# Patient Record
Sex: FEMALE | Race: WHITE | NOT HISPANIC OR LATINO | Employment: OTHER | URBAN - METROPOLITAN AREA
[De-identification: names, ages, dates, MRNs, and addresses within clinical notes are randomized per-mention and may not be internally consistent; named-entity substitution may affect disease eponyms.]

---

## 2024-04-21 ENCOUNTER — HOSPITAL ENCOUNTER (INPATIENT)
Facility: HOSPITAL | Age: 79
LOS: 4 days | Discharge: NURSING, REHAB OR LONG-TERM CARE | End: 2024-04-25
Attending: HOSPITALIST | Admitting: HOSPITALIST
Payer: MEDICARE

## 2024-04-21 ENCOUNTER — APPOINTMENT (OUTPATIENT)
Dept: GENERAL RADIOLOGY | Facility: HOSPITAL | Age: 79
End: 2024-04-21
Payer: MEDICARE

## 2024-04-21 DIAGNOSIS — S72.002A CLOSED FRACTURE OF NECK OF LEFT FEMUR, INITIAL ENCOUNTER: Primary | ICD-10-CM

## 2024-04-21 PROBLEM — I48.91 ATRIAL FIBRILLATION: Status: ACTIVE | Noted: 2024-04-21

## 2024-04-21 PROBLEM — I10 ESSENTIAL HYPERTENSION: Status: ACTIVE | Noted: 2024-04-21

## 2024-04-21 PROBLEM — F03.90 DEMENTIA: Status: ACTIVE | Noted: 2024-04-21

## 2024-04-21 PROBLEM — F41.9 ANXIETY DISORDER: Status: ACTIVE | Noted: 2024-04-21

## 2024-04-21 PROBLEM — D72.829 LEUKOCYTOSIS: Status: ACTIVE | Noted: 2024-04-21

## 2024-04-21 LAB
ABO GROUP BLD: NORMAL
ANION GAP SERPL CALCULATED.3IONS-SCNC: 10 MMOL/L (ref 5–15)
ANION GAP SERPL CALCULATED.3IONS-SCNC: 6 MMOL/L (ref 5–15)
APTT PPP: 29.1 SECONDS (ref 61–76.5)
BASOPHILS # BLD AUTO: 0.01 10*3/MM3 (ref 0–0.2)
BASOPHILS # BLD AUTO: 0.01 10*3/MM3 (ref 0–0.2)
BASOPHILS NFR BLD AUTO: 0.1 % (ref 0–1.5)
BASOPHILS NFR BLD AUTO: 0.1 % (ref 0–1.5)
BLD GP AB SCN SERPL QL: NEGATIVE
BUN SERPL-MCNC: 27 MG/DL (ref 8–23)
BUN SERPL-MCNC: 29 MG/DL (ref 8–23)
BUN/CREAT SERPL: 32.5 (ref 7–25)
BUN/CREAT SERPL: 35.4 (ref 7–25)
CALCIUM SPEC-SCNC: 8.7 MG/DL (ref 8.6–10.5)
CALCIUM SPEC-SCNC: 8.8 MG/DL (ref 8.6–10.5)
CHLORIDE SERPL-SCNC: 104 MMOL/L (ref 98–107)
CHLORIDE SERPL-SCNC: 105 MMOL/L (ref 98–107)
CO2 SERPL-SCNC: 28 MMOL/L (ref 22–29)
CO2 SERPL-SCNC: 31 MMOL/L (ref 22–29)
CREAT SERPL-MCNC: 0.82 MG/DL (ref 0.57–1)
CREAT SERPL-MCNC: 0.83 MG/DL (ref 0.57–1)
DEPRECATED RDW RBC AUTO: 48 FL (ref 37–54)
DEPRECATED RDW RBC AUTO: 48.5 FL (ref 37–54)
EGFRCR SERPLBLD CKD-EPI 2021: 72.3 ML/MIN/1.73
EGFRCR SERPLBLD CKD-EPI 2021: 73.3 ML/MIN/1.73
EOSINOPHIL # BLD AUTO: 0.01 10*3/MM3 (ref 0–0.4)
EOSINOPHIL # BLD AUTO: 0.03 10*3/MM3 (ref 0–0.4)
EOSINOPHIL NFR BLD AUTO: 0.1 % (ref 0.3–6.2)
EOSINOPHIL NFR BLD AUTO: 0.3 % (ref 0.3–6.2)
ERYTHROCYTE [DISTWIDTH] IN BLOOD BY AUTOMATED COUNT: 13.4 % (ref 12.3–15.4)
ERYTHROCYTE [DISTWIDTH] IN BLOOD BY AUTOMATED COUNT: 13.4 % (ref 12.3–15.4)
GLUCOSE SERPL-MCNC: 105 MG/DL (ref 65–99)
GLUCOSE SERPL-MCNC: 123 MG/DL (ref 65–99)
HCT VFR BLD AUTO: 43.6 % (ref 34–46.6)
HCT VFR BLD AUTO: 45.2 % (ref 34–46.6)
HGB BLD-MCNC: 14.2 G/DL (ref 12–15.9)
HGB BLD-MCNC: 14.3 G/DL (ref 12–15.9)
HOLD SPECIMEN: NORMAL
HOLD SPECIMEN: NORMAL
IMM GRANULOCYTES # BLD AUTO: 0.04 10*3/MM3 (ref 0–0.05)
IMM GRANULOCYTES # BLD AUTO: 0.05 10*3/MM3 (ref 0–0.05)
IMM GRANULOCYTES NFR BLD AUTO: 0.4 % (ref 0–0.5)
IMM GRANULOCYTES NFR BLD AUTO: 0.5 % (ref 0–0.5)
INR PPP: 0.96 (ref 0.93–1.1)
LYMPHOCYTES # BLD AUTO: 0.98 10*3/MM3 (ref 0.7–3.1)
LYMPHOCYTES # BLD AUTO: 1.67 10*3/MM3 (ref 0.7–3.1)
LYMPHOCYTES NFR BLD AUTO: 16.7 % (ref 19.6–45.3)
LYMPHOCYTES NFR BLD AUTO: 8.9 % (ref 19.6–45.3)
MCH RBC QN AUTO: 31 PG (ref 26.6–33)
MCH RBC QN AUTO: 31.7 PG (ref 26.6–33)
MCHC RBC AUTO-ENTMCNC: 31.6 G/DL (ref 31.5–35.7)
MCHC RBC AUTO-ENTMCNC: 32.6 G/DL (ref 31.5–35.7)
MCV RBC AUTO: 97.3 FL (ref 79–97)
MCV RBC AUTO: 98 FL (ref 79–97)
MONOCYTES # BLD AUTO: 0.91 10*3/MM3 (ref 0.1–0.9)
MONOCYTES # BLD AUTO: 1.08 10*3/MM3 (ref 0.1–0.9)
MONOCYTES NFR BLD AUTO: 10.8 % (ref 5–12)
MONOCYTES NFR BLD AUTO: 8.3 % (ref 5–12)
NEUTROPHILS NFR BLD AUTO: 7.17 10*3/MM3 (ref 1.7–7)
NEUTROPHILS NFR BLD AUTO: 71.7 % (ref 42.7–76)
NEUTROPHILS NFR BLD AUTO: 82.1 % (ref 42.7–76)
NEUTROPHILS NFR BLD AUTO: 9 10*3/MM3 (ref 1.7–7)
NRBC BLD AUTO-RTO: 0 /100 WBC (ref 0–0.2)
NRBC BLD AUTO-RTO: 0 /100 WBC (ref 0–0.2)
PLATELET # BLD AUTO: 272 10*3/MM3 (ref 140–450)
PLATELET # BLD AUTO: 285 10*3/MM3 (ref 140–450)
PMV BLD AUTO: 9.8 FL (ref 6–12)
PMV BLD AUTO: 9.8 FL (ref 6–12)
POTASSIUM SERPL-SCNC: 4.1 MMOL/L (ref 3.5–5.2)
POTASSIUM SERPL-SCNC: 4.5 MMOL/L (ref 3.5–5.2)
PROTHROMBIN TIME: 10.5 SECONDS (ref 9.6–11.7)
RBC # BLD AUTO: 4.48 10*6/MM3 (ref 3.77–5.28)
RBC # BLD AUTO: 4.61 10*6/MM3 (ref 3.77–5.28)
RH BLD: POSITIVE
SODIUM SERPL-SCNC: 142 MMOL/L (ref 136–145)
SODIUM SERPL-SCNC: 142 MMOL/L (ref 136–145)
T&S EXPIRATION DATE: NORMAL
WBC NRBC COR # BLD AUTO: 10 10*3/MM3 (ref 3.4–10.8)
WBC NRBC COR # BLD AUTO: 10.96 10*3/MM3 (ref 3.4–10.8)

## 2024-04-21 PROCEDURE — 86900 BLOOD TYPING SEROLOGIC ABO: CPT

## 2024-04-21 PROCEDURE — 86901 BLOOD TYPING SEROLOGIC RH(D): CPT | Performed by: INTERNAL MEDICINE

## 2024-04-21 PROCEDURE — 80048 BASIC METABOLIC PNL TOTAL CA: CPT | Performed by: NURSE PRACTITIONER

## 2024-04-21 PROCEDURE — 85025 COMPLETE CBC W/AUTO DIFF WBC: CPT | Performed by: PHYSICIAN ASSISTANT

## 2024-04-21 PROCEDURE — 93005 ELECTROCARDIOGRAM TRACING: CPT | Performed by: PHYSICIAN ASSISTANT

## 2024-04-21 PROCEDURE — 87641 MR-STAPH DNA AMP PROBE: CPT | Performed by: INTERNAL MEDICINE

## 2024-04-21 PROCEDURE — 99285 EMERGENCY DEPT VISIT HI MDM: CPT

## 2024-04-21 PROCEDURE — 80048 BASIC METABOLIC PNL TOTAL CA: CPT | Performed by: PHYSICIAN ASSISTANT

## 2024-04-21 PROCEDURE — 25810000003 SODIUM CHLORIDE 0.9 % SOLUTION: Performed by: NURSE PRACTITIONER

## 2024-04-21 PROCEDURE — 71045 X-RAY EXAM CHEST 1 VIEW: CPT

## 2024-04-21 PROCEDURE — 86850 RBC ANTIBODY SCREEN: CPT | Performed by: INTERNAL MEDICINE

## 2024-04-21 PROCEDURE — 86900 BLOOD TYPING SEROLOGIC ABO: CPT | Performed by: INTERNAL MEDICINE

## 2024-04-21 PROCEDURE — 87102 FUNGUS ISOLATION CULTURE: CPT | Performed by: INTERNAL MEDICINE

## 2024-04-21 PROCEDURE — 73502 X-RAY EXAM HIP UNI 2-3 VIEWS: CPT

## 2024-04-21 PROCEDURE — 85730 THROMBOPLASTIN TIME PARTIAL: CPT | Performed by: PHYSICIAN ASSISTANT

## 2024-04-21 PROCEDURE — 96361 HYDRATE IV INFUSION ADD-ON: CPT

## 2024-04-21 PROCEDURE — 85610 PROTHROMBIN TIME: CPT | Performed by: PHYSICIAN ASSISTANT

## 2024-04-21 PROCEDURE — 86901 BLOOD TYPING SEROLOGIC RH(D): CPT

## 2024-04-21 PROCEDURE — 85025 COMPLETE CBC W/AUTO DIFF WBC: CPT | Performed by: NURSE PRACTITIONER

## 2024-04-21 RX ORDER — TRAMADOL HYDROCHLORIDE 50 MG/1
50 TABLET ORAL EVERY 4 HOURS PRN
COMMUNITY

## 2024-04-21 RX ORDER — POLYETHYLENE GLYCOL 3350 17 G/17G
17 POWDER, FOR SOLUTION ORAL DAILY PRN
COMMUNITY
End: 2024-04-25 | Stop reason: HOSPADM

## 2024-04-21 RX ORDER — PREDNISONE 10 MG/1
10 TABLET ORAL DAILY
Status: DISCONTINUED | OUTPATIENT
Start: 2024-04-22 | End: 2024-04-25 | Stop reason: HOSPADM

## 2024-04-21 RX ORDER — POLYETHYLENE GLYCOL 3350 17 G/17G
17 POWDER, FOR SOLUTION ORAL EVERY MORNING
COMMUNITY
End: 2024-04-25 | Stop reason: HOSPADM

## 2024-04-21 RX ORDER — DILTIAZEM HYDROCHLORIDE 240 MG/1
240 CAPSULE, EXTENDED RELEASE ORAL EVERY MORNING
COMMUNITY
Start: 2024-04-07 | End: 2024-04-25 | Stop reason: HOSPADM

## 2024-04-21 RX ORDER — PREDNISONE 10 MG/1
10 TABLET ORAL DAILY
COMMUNITY
Start: 2024-04-11

## 2024-04-21 RX ORDER — SODIUM CHLORIDE 9 MG/ML
75 INJECTION, SOLUTION INTRAVENOUS CONTINUOUS
Status: DISCONTINUED | OUTPATIENT
Start: 2024-04-21 | End: 2024-04-25 | Stop reason: HOSPADM

## 2024-04-21 RX ORDER — POLYETHYLENE GLYCOL 3350 17 G/17G
17 POWDER, FOR SOLUTION ORAL DAILY
Status: DISCONTINUED | OUTPATIENT
Start: 2024-04-22 | End: 2024-04-25 | Stop reason: HOSPADM

## 2024-04-21 RX ORDER — ACETAMINOPHEN 325 MG/1
325 TABLET ORAL 4 TIMES DAILY
Status: DISCONTINUED | OUTPATIENT
Start: 2024-04-21 | End: 2024-04-25 | Stop reason: HOSPADM

## 2024-04-21 RX ORDER — RISPERIDONE 0.25 MG/1
0.25 TABLET ORAL
COMMUNITY
Start: 2024-04-03

## 2024-04-21 RX ORDER — RISPERIDONE 0.25 MG/1
0.25 TABLET ORAL NIGHTLY
Status: DISCONTINUED | OUTPATIENT
Start: 2024-04-21 | End: 2024-04-25 | Stop reason: HOSPADM

## 2024-04-21 RX ORDER — HYDROCODONE BITARTRATE AND ACETAMINOPHEN 5; 325 MG/1; MG/1
1 TABLET ORAL EVERY 6 HOURS PRN
COMMUNITY
Start: 2024-04-21

## 2024-04-21 RX ORDER — IBUPROFEN 600 MG/1
600 TABLET ORAL 3 TIMES DAILY
COMMUNITY
Start: 2024-04-19 | End: 2024-04-25 | Stop reason: HOSPADM

## 2024-04-21 RX ORDER — DILTIAZEM HYDROCHLORIDE 240 MG/1
240 CAPSULE, COATED, EXTENDED RELEASE ORAL
Status: DISCONTINUED | OUTPATIENT
Start: 2024-04-22 | End: 2024-04-24

## 2024-04-21 RX ORDER — APIXABAN 5 MG/1
5 TABLET, FILM COATED ORAL EVERY 12 HOURS SCHEDULED
COMMUNITY
Start: 2024-04-12

## 2024-04-21 RX ORDER — SODIUM CHLORIDE 0.9 % (FLUSH) 0.9 %
10 SYRINGE (ML) INJECTION AS NEEDED
Status: DISCONTINUED | OUTPATIENT
Start: 2024-04-21 | End: 2024-04-25 | Stop reason: HOSPADM

## 2024-04-21 RX ORDER — LORAZEPAM 0.5 MG/1
0.5 TABLET ORAL 3 TIMES DAILY
COMMUNITY
Start: 2024-03-27

## 2024-04-21 RX ORDER — HYDROCODONE BITARTRATE AND ACETAMINOPHEN 5; 325 MG/1; MG/1
1 TABLET ORAL EVERY 6 HOURS PRN
Status: DISCONTINUED | OUTPATIENT
Start: 2024-04-21 | End: 2024-04-24

## 2024-04-21 RX ORDER — MENTHOL 40 MG/ML
GEL TOPICAL
COMMUNITY

## 2024-04-21 RX ORDER — LORAZEPAM 0.5 MG/1
0.5 TABLET ORAL 3 TIMES DAILY
Status: DISCONTINUED | OUTPATIENT
Start: 2024-04-21 | End: 2024-04-25 | Stop reason: HOSPADM

## 2024-04-21 RX ORDER — BUMETANIDE 1 MG/1
1 TABLET ORAL EVERY MORNING
COMMUNITY
Start: 2024-04-15

## 2024-04-21 RX ORDER — BUMETANIDE 1 MG/1
1 TABLET ORAL DAILY
Status: DISCONTINUED | OUTPATIENT
Start: 2024-04-22 | End: 2024-04-25 | Stop reason: HOSPADM

## 2024-04-21 RX ORDER — ACETAMINOPHEN 325 MG/1
325 TABLET ORAL 4 TIMES DAILY
COMMUNITY
Start: 2024-04-02

## 2024-04-21 RX ADMIN — LORAZEPAM 0.5 MG: 0.5 TABLET ORAL at 23:28

## 2024-04-21 RX ADMIN — HYDROCODONE BITARTRATE AND ACETAMINOPHEN 1 TABLET: 5; 325 TABLET ORAL at 23:28

## 2024-04-21 RX ADMIN — ACETAMINOPHEN 325 MG: 325 TABLET, FILM COATED ORAL at 23:28

## 2024-04-21 RX ADMIN — METOPROLOL TARTRATE 25 MG: 25 TABLET, FILM COATED ORAL at 23:28

## 2024-04-21 RX ADMIN — RISPERIDONE 0.25 MG: 0.25 TABLET, FILM COATED ORAL at 23:28

## 2024-04-21 RX ADMIN — SODIUM CHLORIDE 75 ML/HR: 9 INJECTION, SOLUTION INTRAVENOUS at 20:41

## 2024-04-21 RX ADMIN — SODIUM CHLORIDE 75 ML/HR: 9 INJECTION, SOLUTION INTRAVENOUS at 23:51

## 2024-04-21 NOTE — ED PROVIDER NOTES
Subjective   History of Present Illness  Patient is a 79-year-old female from [place].  Past medical history significant for A-fib on Eliquis, dementia.  She presents with a left hip fracture.  She has no complaints      Per her niece.  She fell about a month ago has been able to ambulate with pain and a walker or wheelchair but it worsened over the last day.  Did an x-ray and found that she had a left hip fracture        Review of Systems   Unable to perform ROS: Dementia   Musculoskeletal:  Positive for arthralgias.       No past medical history on file.    Allergies   Allergen Reactions    Spironolactone Unknown - High Severity    Sulfa Antibiotics Unknown - High Severity       No past surgical history on file.    No family history on file.    Social History     Socioeconomic History    Marital status: Unknown           Objective   Physical Exam  Vitals and nursing note reviewed.   Constitutional:       General: She is not in acute distress.     Appearance: She is well-developed. She is not ill-appearing, toxic-appearing or diaphoretic.   HENT:      Head: Normocephalic and atraumatic.      Nose: Nose normal.   Eyes:      Pupils: Pupils are equal, round, and reactive to light.   Pulmonary:      Effort: Pulmonary effort is normal.   Musculoskeletal:         General: Normal range of motion.      Cervical back: Normal range of motion.      Comments: Left leg is shortened and externally rotated when compared to right   Skin:     General: Skin is warm and dry.   Neurological:      General: No focal deficit present.      Mental Status: She is alert and oriented to person, place, and time.   Psychiatric:         Mood and Affect: Mood normal.         Behavior: Behavior normal.         Thought Content: Thought content normal.         Judgment: Judgment normal.         Procedures           ED Course                                             Medical Decision Making  Appropriate PPE worn during exam.    /67 (BP  "Location: Left arm, Patient Position: Sitting)   Pulse 96   Temp 99 °F (37.2 °C)   Resp 20   Ht 162.6 cm (64\")   Wt 72.6 kg (160 lb)   SpO2 96%   BMI 27.46 kg/m²      Co-morbidities --  has no past medical history on file.  Radiology interpretation --  X-rays reviewed by me and independently interpreted by radiologist:  XR Hip With or Without Pelvis 2 - 3 View Left    Result Date: 4/21/2024  Impression: Mildly comminuted subcapital fracture of the left femoral neck. There is varus angulation and displacement as described. Electronically Signed: Prosper Blankenship MD  4/21/2024 6:07 PM EDT  Workstation ID: [id number]    XR Chest 1 View    Result Date: 4/21/2024  Impression: No acute cardiopulmonary findings. Mildly enlarged cardiac silhouette. Electronically Signed: Alejandro Elizabeth MD  4/21/2024 6:07 PM EDT  Workstation ID: [id number]      Patient is a 79-year-old female who presents with left femoral neck fracture.  Patient will be admitted to the hospitalist.  Orthopedic consult is obtained.    Discussion with provider --with Dr. Lovett with orthopedist he recommends cardiology inpatient referral since patient is on Eliquis.  I discussed the findings and recommendations with the patient who voices understanding. Stable while in the ER.     Note Disclaimer: At [facility], we believe that sharing information builds trust and better relationships. You are receiving this note because you are receiving care at [facility] or recently visited. It is possible you will see health information before a provider has talked with you about it. This kind of information can be easy to misunderstand. To help you fully understand what it means for your health, we urge you to discuss this note with your provider.        Problems Addressed:  Closed fracture of neck of left femur, initial encounter: complicated acute illness or injury    Amount and/or Complexity of Data Reviewed  Labs: ordered.  Radiology: ordered.  ECG/medicine " tests: ordered.    Risk  Prescription drug management.  Decision regarding hospitalization.        Final diagnoses:   Closed fracture of neck of left femur, initial encounter       ED Disposition  ED Disposition       ED Disposition   Decision to Admit    Condition   --    Comment   --               No follow-up provider specified.       Medication List      No changes were made to your prescriptions during this visit.            Sherice Atkins PA-C  04/21/24 8179

## 2024-04-21 NOTE — H&P
[facility] Medicine Services  History & Physical    Patient Name: Philly Valentin  : 1945  MRN: [id number]  Primary Care Physician:  Fina Ambrocio MD  Date of admission: 2024  Date and Time of Service: 2024 at 1915    Subjective      Chief Complaint: unable to ambulate     History of Present Illness: Philly Valentin is a 79 y.o. female from [facility] with a CMH of dementia, anxiety, essential hypertension, atrial fibrillation on anticoagulation, who presented to [facility] on 2024 with reports from the nursing home for fall a month ago and was unable to ambulate.  She is awake, alert, pleasant and cooperative, verbal but has childlike confusion likely at baseline with history of dementia.  No information could be obtained from patient.  No family at bedside, minimal records in EMR and no documentation from outside facility.  X-ray was done at outside facility and showed a left hip fracture.  X-ray here per radiology showed mildly comminuted subcapital fracture of the left femoral neck there is a varus angulation and displacement .  Chest x-ray per radiology shows no acute cardiopulmonary findings mildly enlarged cardiac silhouette.  EKG shows atrial fibrillation heart rate 97.  Urinalysis negative for infection.  Labs today show BUN of 28 WBC 10.96.    Orthopedic surgery has been consulted and she will be admitted for further evaluation and treatment.  Review of Systems   Unable to perform ROS: Dementia       Personal History     Past Medical History:   Diagnosis Date    Afib     Dementia     Hypertension     Mood disorder        History reviewed. No pertinent surgical history.    Family History: family history is not on file. Otherwise pertinent FHx was reviewed and not pertinent to current issue.    Social History:  reports that she does not currently use alcohol. She reports that she does not currently use drugs.    Home Medications:  Prior to Admission  Medications       None              Allergies:  Allergies   Allergen Reactions    Spironolactone Unknown - High Severity    Sulfa Antibiotics Unknown - High Severity       Objective      Vitals:   Temp:  [99 °F (37.2 °C)] 99 °F (37.2 °C)  Heart Rate:  [77-96] 96  Resp:  [19-20] 20  BP: (138-141)/(67-88) 141/67  Body mass index is 27.46 kg/m².  Physical Exam  Vitals reviewed.   Constitutional:       Appearance: Normal appearance.   HENT:      Head: Normocephalic and atraumatic.      Right Ear: External ear normal.      Left Ear: External ear normal.      Nose: Nose normal.      Mouth/Throat:      Mouth: Mucous membranes are moist.   Eyes:      Extraocular Movements: Extraocular movements intact.   Cardiovascular:      Rate and Rhythm: Normal rate. Rhythm irregular.      Pulses: Normal pulses.      Heart sounds: Normal heart sounds.   Pulmonary:      Effort: Pulmonary effort is normal.      Breath sounds: Normal breath sounds.   Abdominal:      Palpations: Abdomen is soft.   Genitourinary:     Comments: deferred  Musculoskeletal:      Cervical back: Normal range of motion and neck supple.      Comments: ROM LLE limited due to fracture    Skin:     General: Skin is warm and dry.      Coloration: Skin is pale.   Neurological:      General: No focal deficit present.      Mental Status: She is alert. Mental status is at baseline.      Comments: Disoriented HX dementia   Psychiatric:         Mood and Affect: Mood normal.         Behavior: Behavior normal.      Comments: Cooperative , pleasant and verbal but confused          Diagnostic Data:  Lab Results (last 24 hours)       Procedure Component Value Units Date/Time    Extra Tubes [[id number]] Collected: 04/21/24 1749    Specimen: Blood, Venous Line Updated: 04/21/24 1900    Narrative:      The following orders were created for panel order Extra Tubes.  Procedure                               Abnormality         Status                     ---------                                -----------         ------                     Red Top[[id number]]                                          Final result               Gold Top - SST[[id number]]                                   Final result                 Please view results for these tests on the individual orders.    Red Top [[id number]] Collected: 04/21/24 1749    Specimen: Blood Updated: 04/21/24 1900     Extra Tube Hold for add-ons.     Comment: Auto resulted.       [facility] - SST [[id number]] Collected: 04/21/24 1749    Specimen: Blood Updated: 04/21/24 1900     Extra Tube Hold for add-ons.     Comment: Auto resulted.       Basic Metabolic Panel [[id number]]  (Abnormal) Collected: 04/21/24 1749    Specimen: Blood Updated: 04/21/24 1820     Glucose 123 mg/dL      BUN 29 mg/dL      Creatinine 0.82 mg/dL      Sodium 142 mmol/L      Potassium 4.5 mmol/L      Comment: Slight hemolysis detected by analyzer. Result may be falsely elevated.        Chloride 104 mmol/L      CO2 28.0 mmol/L      Calcium 8.8 mg/dL      BUN/Creatinine Ratio 35.4     Anion Gap 10.0 mmol/L      eGFR 73.3 mL/min/1.73     Narrative:      GFR Normal >60  Chronic Kidney Disease <60  Kidney Failure <15    The GFR formula is only valid for adults with stable renal function between ages 18 and 70.    Protime-INR [[id number]]  (Normal) Collected: 04/21/24 1749    Specimen: Blood Updated: 04/21/24 1812     Protime 10.5 Seconds      INR 0.96    aPTT [[id number]]  (Abnormal) Collected: 04/21/24 1749    Specimen: Blood Updated: 04/21/24 1812     PTT 29.1 seconds     CBC & Differential [[id number]]  (Abnormal) Collected: 04/21/24 1749    Specimen: Blood Updated: 04/21/24 1800    Narrative:      The following orders were created for panel order CBC & Differential.  Procedure                               Abnormality         Status                     ---------                               -----------         ------                     CBC Auto Differential[[id number]]        Abnormal             Final result                 Please view results for these tests on the individual orders.    CBC Auto Differential [[id number]]  (Abnormal) Collected: 04/21/24 1749    Specimen: Blood Updated: 04/21/24 1800     WBC 10.96 10*3/mm3      RBC 4.61 10*6/mm3      Hemoglobin 14.3 g/dL      Hematocrit 45.2 %      MCV 98.0 fL      MCH 31.0 pg      MCHC 31.6 g/dL      RDW 13.4 %      RDW-SD 48.0 fl      MPV 9.8 fL      Platelets 285 10*3/mm3      Neutrophil % 82.1 %      Lymphocyte % 8.9 %      Monocyte % 8.3 %      Eosinophil % 0.1 %      Basophil % 0.1 %      Immature Grans % 0.5 %      Neutrophils, Absolute 9.00 10*3/mm3      Lymphocytes, Absolute 0.98 10*3/mm3      Monocytes, Absolute 0.91 10*3/mm3      Eosinophils, Absolute 0.01 10*3/mm3      Basophils, Absolute 0.01 10*3/mm3      Immature Grans, Absolute 0.05 10*3/mm3      nRBC 0.0 /100 WBC              Imaging Results (Last 24 Hours)       Procedure Component Value Units Date/Time    XR Hip With or Without Pelvis 2 - 3 View Left [[id number]] Collected: 04/21/24 1806     Updated: 04/21/24 1809    Narrative:      XR HIP W OR WO PELVIS 2-3 VIEW LEFT    Date of Exam: 4/21/2024 5:52 PM EDT    Indication: Left hip pain, suspected hip fracture    Comparison: None available.    Findings:  There is a mildly comminuted subcapital fracture of the left femoral neck. There is varus angulation at the fracture site. The femoral neck is displaced cephalad with respect of the adjacent femoral head of approximately 1.0 cm. The femoral head is still   seated in the acetabular groove. The bony structures are demineralized. There are degenerative changes in the lower lumbar spine. There is increased stool within the rectal sigmoid colon.      Impression:      Impression:  Mildly comminuted subcapital fracture of the left femoral neck. There is varus angulation and displacement as described.      Electronically Signed: Prosper Blankenship MD    4/21/2024 6:07 PM EDT    Workstation ID: [id number]     XR Chest 1 View [[id number]] Collected: 04/21/24 1806     Updated: 04/21/24 1809    Narrative:      XR CHEST 1 VW    Date of Exam: 4/21/2024 5:52 PM EDT    Indication: pre op CXR    Comparison: None available.    Findings:  Enlarged cardiac silhouette. Mild background of chronic/senescent changes of the lungs. No focal consolidation or overt pulmonary edema. No pleural effusion or pneumothorax. No acute osseous abnormality.      Impression:      Impression:  No acute cardiopulmonary findings. Mildly enlarged cardiac silhouette.      Electronically Signed: Alejandro Elizabeth MD    4/21/2024 6:07 PM EDT    Workstation ID: [id number]              Assessment & Plan        This is a 79 y.o. female with:    Active and Resolved Problems  Active Hospital Problems    Diagnosis  POA    **Closed left hip fracture [S72.002A]  Yes     Priority: High    Atrial fibrillation [I48.91]  Yes     Priority: Medium    Leukocytosis [D72.829]  Yes     Priority: Medium    Dementia [F03.90]  Yes    Anxiety disorder [F41.9]  Yes    Essential hypertension [I10]  Yes      Resolved Hospital Problems   No resolved problems to display.     Medications verified from sidebar summary recent prescription    Close left hip fracture per CT, orthopedics consulted, reordered home [place] 5/3/2025 every 6 hours, fall precautions    Atrial fibrillation rate controlled, reordered home diltiazem, Eliquis and metoprolol, continuous cardiac monitoring    Leukocytosis, afebrile urinalysis and chest x-ray negative likely reactive repeat CBC in a.m.    Dementia, on home risperidone reordered    Anxiety disorder, on home sertraline and lorazepam verified by sidebar summary recent prescriptions    Essential hypertension, reordered home Bumex, diltiazem, metoprolol monitor BP      DVT prophylaxis:  Medical DVT prophylaxis orders are present.        The patient desires to be as follows:    CODE STATUS:    Code Status (Patient has no pulse and is not breathing): CPR (Attempt  to Resuscitate)  Medical Interventions (Patient has pulse or is breathing): Full Support        Admission Status:  I believe this patient meets inpatient  status.    Expected Length of Stay: pending orthopedic evaluation    PDMP and Medication Dispenses via Sidebar reviewed and consistent with patient reported medications.    I discussed the patient's findings and my recommendations with patient.      Signature:     This document has been electronically signed by HUMZA Hurtado on April 21, 2024 20:30 EDT   [facility]

## 2024-04-21 NOTE — LETTER
EMS Transport Request  For use at [facility], [place], [place], [place], and [place] only   Patient Name: Philly Valentin : 1945   Weight:72.6 kg (160 lb) Pick-up Location: [facility] BLS/ALS: BLS/ALS: BLS   Insurance: [employer] MEDICARE REPLACEMENT Auth End Date: 24   Pre-Cert #: D/C Summary complete:    Destination: Other [place]  [address]   Contact Precautions: Contact.  MRSA   Equipment (O2, Fluids, etc.): None   Arrive By Date/Time:  Stretcher/WC: Stretcher   CM Requesting: Selena Amaral RN Ext: [phone]   Notes/Medical Necessity: New Hip fx; Confusion; Max assist of 2 with transfers; Poor trunk control; Unable to sit unsupported.   (WILL CALL PLEASE)     ______________________________________________________________________    *Only 2 patient bags OR 1 carry-on size bag are permitted.  Wheelchairs and walkers CANNOT transported with the patient. Acknowledge: Yes

## 2024-04-21 NOTE — Clinical Note
Level of Care: Med/Surg [1]   Admitting Physician: BRIANNE MAIN [[id number]]   Attending Physician: BRIANNE MAIN [[id number]]

## 2024-04-22 LAB
MRSA DNA SPEC QL NAA+PROBE: ABNORMAL
QT INTERVAL: 373 MS
QTC INTERVAL: 474 MS

## 2024-04-22 PROCEDURE — 92610 EVALUATE SWALLOWING FUNCTION: CPT

## 2024-04-22 PROCEDURE — 63710000001 PREDNISONE PER 5 MG: Performed by: NURSE PRACTITIONER

## 2024-04-22 PROCEDURE — 25010000002 MORPHINE PER 10 MG: Performed by: HOSPITALIST

## 2024-04-22 PROCEDURE — 99222 1ST HOSP IP/OBS MODERATE 55: CPT | Performed by: STUDENT IN AN ORGANIZED HEALTH CARE EDUCATION/TRAINING PROGRAM

## 2024-04-22 RX ORDER — ACETAMINOPHEN 325 MG/1
650 TABLET ORAL EVERY 6 HOURS PRN
Status: DISCONTINUED | OUTPATIENT
Start: 2024-04-22 | End: 2024-04-24

## 2024-04-22 RX ORDER — METOPROLOL TARTRATE 1 MG/ML
5 INJECTION, SOLUTION INTRAVENOUS ONCE
Status: COMPLETED | OUTPATIENT
Start: 2024-04-22 | End: 2024-04-22

## 2024-04-22 RX ORDER — MORPHINE SULFATE 2 MG/ML
2 INJECTION, SOLUTION INTRAMUSCULAR; INTRAVENOUS EVERY 4 HOURS PRN
Status: DISCONTINUED | OUTPATIENT
Start: 2024-04-22 | End: 2024-04-24

## 2024-04-22 RX ADMIN — MORPHINE SULFATE 2 MG: 2 INJECTION, SOLUTION INTRAMUSCULAR; INTRAVENOUS at 20:50

## 2024-04-22 RX ADMIN — BUMETANIDE 1 MG: 1 TABLET ORAL at 12:56

## 2024-04-22 RX ADMIN — MORPHINE SULFATE 2 MG: 2 INJECTION, SOLUTION INTRAMUSCULAR; INTRAVENOUS at 14:46

## 2024-04-22 RX ADMIN — METOPROLOL TARTRATE 5 MG: 1 INJECTION, SOLUTION INTRAVENOUS at 15:31

## 2024-04-22 RX ADMIN — DILTIAZEM HYDROCHLORIDE 240 MG: 240 CAPSULE, EXTENDED RELEASE ORAL at 12:56

## 2024-04-22 RX ADMIN — METOPROLOL TARTRATE 25 MG: 25 TABLET, FILM COATED ORAL at 20:50

## 2024-04-22 RX ADMIN — SERTRALINE HYDROCHLORIDE 75 MG: 50 TABLET ORAL at 12:56

## 2024-04-22 RX ADMIN — HYDROCODONE BITARTRATE AND ACETAMINOPHEN 1 TABLET: 5; 325 TABLET ORAL at 12:56

## 2024-04-22 RX ADMIN — Medication 10 ML: at 20:51

## 2024-04-22 RX ADMIN — LORAZEPAM 0.5 MG: 0.5 TABLET ORAL at 14:46

## 2024-04-22 RX ADMIN — ACETAMINOPHEN 325 MG: 325 TABLET, FILM COATED ORAL at 20:49

## 2024-04-22 RX ADMIN — MORPHINE SULFATE 2 MG: 2 INJECTION, SOLUTION INTRAMUSCULAR; INTRAVENOUS at 09:29

## 2024-04-22 RX ADMIN — ACETAMINOPHEN 325 MG: 325 TABLET, FILM COATED ORAL at 12:56

## 2024-04-22 RX ADMIN — PREDNISONE 10 MG: 10 TABLET ORAL at 12:56

## 2024-04-22 RX ADMIN — RISPERIDONE 0.25 MG: 0.25 TABLET, FILM COATED ORAL at 20:49

## 2024-04-22 RX ADMIN — LORAZEPAM 0.5 MG: 0.5 TABLET ORAL at 20:50

## 2024-04-22 NOTE — CONSULTS
ORTHOPEDIC SURGERY CONSULT      Patient: Philly Valentin  Date of Admission: 4/21/2024  5:30 PM  YOB: 1945  Medical Record Number: [id number]  Attending Physician: Ceci Torre MD  Consulting Physician: Perfecto Lovett MD    Consult received, chart reviewed.  Patient on Eliquis.  Dr. Lopes will plan to do a left bipolar arthroplasty Tuesday.  Full consult to follow tomorrow.    Perfecto Lovett MD  Date: 4/21/2024

## 2024-04-22 NOTE — PROGRESS NOTES
[facility] MEDICINE SERVICE  DAILY PROGRESS NOTE    NAME: Philly Valentin  : 1945  MRN: [id number]      LOS: 1 day     PROVIDER OF SERVICE: Timothy Duane Brammell, MD    Chief Complaint: Closed left hip fracture    Subjective:     Interval History:  History taken from: patient RN  Patient Complaints: Patient with complaints of pain.  She denies any shortness of breath.  Nurses unaware of any other additional acute concerns.  Plan for surgical repair tomorrow.      Review of Systems:   Review of Systems   Reason unable to perform ROS: demented.       Objective:     Vital Signs  Temp:  [97.4 °F (36.3 °C)-99 °F (37.2 °C)] 97.7 °F (36.5 °C)  Heart Rate:  [] 98  Resp:  [17-20] 19  BP: (136-171)/(67-99) 145/96   Body mass index is 27.46 kg/m².    Physical Exam  Physical Exam  Constitutional:       Appearance: Normal appearance.   HENT:      Head: Normocephalic.   Cardiovascular:      Rate and Rhythm: Normal rate.   Pulmonary:      Effort: Pulmonary effort is normal.      Breath sounds: Normal breath sounds.   Abdominal:      General: Bowel sounds are normal.      Palpations: Abdomen is soft.      Tenderness: There is no abdominal tenderness.   Musculoskeletal:         General: No swelling.      Comments: Left lower extremity extended with little movement.   Neurological:      Mental Status: She is alert.         Scheduled Meds   acetaminophen, 325 mg, Oral, 4x Daily  [Held by provider] apixaban, 5 mg, Oral, Q12H  bumetanide, 1 mg, Oral, Daily  [START ON 2024] ceFAZolin, 2,000 mg, Intravenous, Once  dilTIAZem CD, 240 mg, Oral, Q24H  [START ON 2024] ethyl alcohol, 2 Swab, Nasal, Once  LORazepam, 0.5 mg, Oral, TID  metoprolol tartrate, 25 mg, Oral, BID  polyethylene glycol, 17 g, Oral, Daily  predniSONE, 10 mg, Oral, Daily  risperiDONE, 0.25 mg, Oral, Nightly  sertraline, 75 mg, Oral, Daily       PRN Meds     acetaminophen    HYDROcodone-acetaminophen    Menthol (Topical Analgesic)    Morphine     [COMPLETED] Insert Peripheral IV **AND** sodium chloride   Infusions  sodium chloride, 75 mL/hr, Last Rate: 75 mL/hr (04/21/24 2351)          Diagnostic Data    Results from last 7 days   Lab Units 04/21/24  2231   WBC 10*3/mm3 10.00   HEMOGLOBIN g/dL 14.2   HEMATOCRIT % 43.6   PLATELETS 10*3/mm3 272   GLUCOSE mg/dL 105*   CREATININE mg/dL 0.83   BUN mg/dL 27*   SODIUM mmol/L 142   POTASSIUM mmol/L 4.1   ANION GAP mmol/L 6.0       XR Hip With or Without Pelvis 2 - 3 View Left    Result Date: 4/21/2024  Impression: Mildly comminuted subcapital fracture of the left femoral neck. There is varus angulation and displacement as described. Electronically Signed: Prosper Blankenship MD  4/21/2024 6:07 PM EDT  Workstation ID: [id number]    XR Chest 1 View    Result Date: 4/21/2024  Impression: No acute cardiopulmonary findings. Mildly enlarged cardiac silhouette. Electronically Signed: Alejandro Elizabeth MD  4/21/2024 6:07 PM EDT  Workstation ID: [id number]       I reviewed the patient's new clinical results.    Assessment:    1.  Left femoral fracture awaiting surgical repair  2.  Dementia, chronic  3.  Chronic atrial fibs  4.  Chronic anticoagulation currently on hold  5.  Falling   6.  MRSA colonization  7.  Chronic diastolic congestive heart failure on diuretic.  8.  Chronic benzodiazepine use.  9.  Chronic steroid use.  10.  Dysphagia      Plan: Plan surgical repair of the hip when appropriate after appropriate timing of Eliquis is presently on hold.  Medications reviewed.  Pain control with morphine ordered IV.  Speech evaluating the patient swallowing abilities.  Will need appropriate discharge planning after operative repair.  Active and Resolved Problems  Active Hospital Problems    Diagnosis  POA    **Closed left hip fracture [S72.002A]  Yes    Dementia [F03.90]  Yes    Anxiety disorder [F41.9]  Yes    Atrial fibrillation [I48.91]  Yes    Essential hypertension [I10]  Yes    Leukocytosis [D72.829]  Yes      Resolved Hospital  Problems   No resolved problems to display.           DVT prophylaxis:  Medical DVT prophylaxis orders are present.         Code status is   Code Status and Medical Interventions:   Ordered at: 04/21/24 1920     Code Status (Patient has no pulse and is not breathing):    CPR (Attempt to Resuscitate)     Medical Interventions (Patient has pulse or is breathing):    Full Support       Plan for disposition: Facility in 3 days    Time: 30 minutes    Signature: Electronically signed by Timothy Duane Brammell, MD, 04/22/24, 10:34 EDT.  [facility] Hospitalist Team

## 2024-04-22 NOTE — PLAN OF CARE
Goal Outcome Evaluation:      Pt brought to unit from ER. Pt confused and disoriented. Pt complaints of pain managed with PO medication. Pt positive for MRSA. Pt NPO since midnight. Pt having left hip arthroplasty later. VS stable. Plan of care ongoing.

## 2024-04-22 NOTE — CONSULTS
ORTHOPEDIC SURGERY CONSULT      Patient: Philly Valentin  Date of Admission: 4/21/2024  5:30 PM  YOB: 1945  Medical Record Number: [id number]  Attending Physician: Brammell, Timothy Duane,*  Consulting Physician: Perfecto Lovett MD    CHIEF COMPLAINT: Left hip pain    HISTORY OF PRESENT ILLNESS: Patient is a 79 y.o. year old female presents to [facility] with above complaints.  I was consulted for further evaluation and treatment.  The patient is demented.  All history is obtained from the medical record.  Patient resides at [facility] with a CMH of dementia, anxiety, essential hypertension, atrial fibrillation on Eliquis anticoagulation, who presented to [facility] on 4/21/2024 with reports from the nursing home for fall a month ago and was unable to ambulate.  She is awake, alert, pleasant and cooperative, verbal but has childlike confusion likely at baseline with history of dementia.   X-ray obtained at [facility] demonstrate a left displaced femoral neck fracture.       ALLERGIES:   Allergies   Allergen Reactions    Spironolactone Unknown - High Severity    Sulfa Antibiotics Unknown - High Severity       HOME MEDICATIONS:  Medications Prior to Admission   Medication Sig Dispense Refill Last Dose    acetaminophen (TYLENOL) 325 MG tablet Take 1 tablet by mouth 4 (Four) Times a Day.       bumetanide (BUMEX) 1 MG tablet Take 1 tablet by mouth Every Morning.       Dilt- MG 24 hr capsule Take 1 capsule by mouth Every Morning.       Eliquis 5 MG tablet tablet Take 1 tablet by mouth Every 12 (Twelve) Hours.       HYDROcodone-acetaminophen (NORCO) 5-325 MG per tablet Take 1 tablet by mouth Every 6 (Six) Hours As Needed.       ibuprofen (ADVIL,MOTRIN) 600 MG tablet Take 1 tablet by mouth 3 times a day.       LORazepam (ATIVAN) 0.5 MG tablet Take 1 tablet by mouth 3 times a day. Anxiety.       Menthol, Topical Analgesic, (Biofreeze) 4 % gel Apply  topically.  Apply topically to back every 6 hours as needed.       metoprolol tartrate (LOPRESSOR) 25 MG tablet Take 1 tablet by mouth 2 (Two) Times a Day.       polyethylene glycol (MIRALAX) 17 GM/SCOOP powder Take 17 g by mouth Every Morning.       predniSONE (DELTASONE) 10 MG tablet Take 1 tablet by mouth Daily.       risperiDONE (risperDAL) 0.25 MG tablet Take 1 tablet by mouth every night at bedtime.       sertraline (ZOLOFT) 50 MG tablet Take 1.5 tablets by mouth Daily.       polyethylene glycol (MiraLax) 17 GM/SCOOP powder Take 17 g by mouth Daily As Needed.       traMADol (ULTRAM) 50 MG tablet Take 1 tablet by mouth Every 4 (Four) Hours As Needed for Moderate Pain.          CURRENT MEDICATIONS:  Scheduled Meds:acetaminophen, 325 mg, Oral, 4x Daily  [Held by provider] apixaban, 5 mg, Oral, Q12H  bumetanide, 1 mg, Oral, Daily  ceFAZolin, 2,000 mg, Intravenous, Once  dilTIAZem CD, 240 mg, Oral, Q24H  ethyl alcohol, 2 Swab, Nasal, Once  LORazepam, 0.5 mg, Oral, TID  metoprolol tartrate, 25 mg, Oral, BID  polyethylene glycol, 17 g, Oral, Daily  predniSONE, 10 mg, Oral, Daily  risperiDONE, 0.25 mg, Oral, Nightly  sertraline, 75 mg, Oral, Daily      Continuous Infusions:sodium chloride, 75 mL/hr, Last Rate: 75 mL/hr (04/21/24 6318)      PRN Meds:.  HYDROcodone-acetaminophen    Menthol (Topical Analgesic)    [COMPLETED] Insert Peripheral IV **AND** sodium chloride    Past Medical History:   Diagnosis Date    Afib     Dementia     Hypertension     Mood disorder      History reviewed. No pertinent surgical history.  Social History     Occupational History    Not on file   Tobacco Use    Smoking status: Never    Smokeless tobacco: Never   Vaping Use    Vaping status: Never Used   Substance and Sexual Activity    Alcohol use: Never    Drug use: Never    Sexual activity: Defer      Social History     Social History Narrative    Not on file     History reviewed. No pertinent family history.    REVIEW OF SYSTEMS:    Unable to obtain  secondary to dementia    PHYSICAL EXAM:   Vitals:  Vitals:    04/21/24 2046 04/21/24 2120 04/22/24 0001 04/22/24 0410   BP: 148/82 158/89 142/99 171/82   BP Location:  Left arm Left arm Left arm   Patient Position:  Lying Lying Lying   Pulse: 84 81 117 94   Resp:  19 19 17   Temp:  98.3 °F (36.8 °C) 97.4 °F (36.3 °C) 98.1 °F (36.7 °C)   TempSrc:  Rectal Axillary Oral   SpO2: 96% 98% 97% 94%   Weight:       Height:           General:  79 y.o. female who appears about stated age.    Alert, cooperative, confused, in no acute distress         Head:    Normocephalic, without obvious abnormality, atraumatic   Eyes:            Lids and lashes normal, conjunctivae and sclerae normal, no         icterus, no pallor, corneas clear, PERRLA   Ears:    Ears appear intact with no abnormalities noted   Throat:   No oral lesions, no thrush, oral mucosa moist   Neck:   No adenopathy, supple, trachea midline, no JVD   Back:     Limited exam shows no severe kyphosis present,no visible           erythema, no excessive  tenderness to palpation.    Lungs:     Respirations regular, even and unlabored.     Heart:    Normal rate, Pulses palpable   Chest Wall:    No abnormalities observed.   Abdomen:     Normal bowel sounds, no masses, no organomegaly, soft              non-tender, non-distended, no guarding, no rebound                      tenderness   Rectal:     Deferred   Pulses:   Pulses palpable and equal bilaterally   Skin:   No bleeding, bruising or rash   Lymph nodes:   No palpable adenopathy   Extremities:     Left hip: Skin is intact.  There is no erythema no signs infection.  Positive logroll.  Left leg is shortened and rotated.  She is able to wiggle her toes distally.    DIAGNOSTIC TEST:  Admission on 04/21/2024   Component Date Value Ref Range Status    Glucose 04/21/2024 123 (H)  65 - 99 mg/dL Final    BUN 04/21/2024 29 (H)  8 - 23 mg/dL Final    Creatinine 04/21/2024 0.82  0.57 - 1.00 mg/dL Final    Sodium 04/21/2024 142  136  - 145 mmol/L Final    Potassium 04/21/2024 4.5  3.5 - 5.2 mmol/L Final    Slight hemolysis detected by analyzer. Result may be falsely elevated.    Chloride 04/21/2024 104  98 - 107 mmol/L Final    CO2 04/21/2024 28.0  22.0 - 29.0 mmol/L Final    Calcium 04/21/2024 8.8  8.6 - 10.5 mg/dL Final    BUN/Creatinine Ratio 04/21/2024 35.4 (H)  7.0 - 25.0 Final    Anion Gap 04/21/2024 10.0  5.0 - 15.0 mmol/L Final    eGFR 04/21/2024 73.3  >60.0 mL/min/1.73 Final    Protime 04/21/2024 10.5  9.6 - 11.7 Seconds Final    INR 04/21/2024 0.96  0.93 - 1.10 Final    PTT 04/21/2024 29.1 (L)  61.0 - 76.5 seconds Final    WBC 04/21/2024 10.96 (H)  3.40 - 10.80 10*3/mm3 Final    RBC 04/21/2024 4.61  3.77 - 5.28 10*6/mm3 Final    Hemoglobin 04/21/2024 14.3  12.0 - 15.9 g/dL Final    Hematocrit 04/21/2024 45.2  34.0 - 46.6 % Final    MCV 04/21/2024 98.0 (H)  79.0 - 97.0 fL Final    MCH 04/21/2024 31.0  26.6 - 33.0 pg Final    MCHC 04/21/2024 31.6  31.5 - 35.7 g/dL Final    RDW 04/21/2024 13.4  12.3 - 15.4 % Final    RDW-SD 04/21/2024 48.0  37.0 - 54.0 fl Final    MPV 04/21/2024 9.8  6.0 - 12.0 fL Final    Platelets 04/21/2024 285  140 - 450 10*3/mm3 Final    Neutrophil % 04/21/2024 82.1 (H)  42.7 - 76.0 % Final    Lymphocyte % 04/21/2024 8.9 (L)  19.6 - 45.3 % Final    Monocyte % 04/21/2024 8.3  5.0 - 12.0 % Final    Eosinophil % 04/21/2024 0.1 (L)  0.3 - 6.2 % Final    Basophil % 04/21/2024 0.1  0.0 - 1.5 % Final    Immature Grans % 04/21/2024 0.5  0.0 - 0.5 % Final    Neutrophils, Absolute 04/21/2024 9.00 (H)  1.70 - 7.00 10*3/mm3 Final    Lymphocytes, Absolute 04/21/2024 0.98  0.70 - 3.10 10*3/mm3 Final    Monocytes, Absolute 04/21/2024 0.91 (H)  0.10 - 0.90 10*3/mm3 Final    Eosinophils, Absolute 04/21/2024 0.01  0.00 - 0.40 10*3/mm3 Final    Basophils, Absolute 04/21/2024 0.01  0.00 - 0.20 10*3/mm3 Final    Immature Grans, Absolute 04/21/2024 0.05  0.00 - 0.05 10*3/mm3 Final    nRBC 04/21/2024 0.0  0.0 - 0.2 /100 WBC Final    QT  Interval 04/21/2024 373  ms Final    QTC Interval 04/21/2024 474  ms Final    Extra Tube 04/21/2024 Hold for add-ons.   Final    Auto resulted.    Extra Tube 04/21/2024 Hold for add-ons.   Final    Auto resulted.    ABO Type 04/21/2024 O   Final    RH type 04/21/2024 Positive   Final    Antibody Screen 04/21/2024 Negative   Final    T&S Expiration Date 04/21/2024 4/24/2024 11:59:59 PM   Final    MRSA PCR 04/21/2024 MRSA Detected (A)  No MRSA Detected Final    Glucose 04/21/2024 105 (H)  65 - 99 mg/dL Final    BUN 04/21/2024 27 (H)  8 - 23 mg/dL Final    Creatinine 04/21/2024 0.83  0.57 - 1.00 mg/dL Final    Sodium 04/21/2024 142  136 - 145 mmol/L Final    Potassium 04/21/2024 4.1  3.5 - 5.2 mmol/L Final    Chloride 04/21/2024 105  98 - 107 mmol/L Final    CO2 04/21/2024 31.0 (H)  22.0 - 29.0 mmol/L Final    Calcium 04/21/2024 8.7  8.6 - 10.5 mg/dL Final    BUN/Creatinine Ratio 04/21/2024 32.5 (H)  7.0 - 25.0 Final    Anion Gap 04/21/2024 6.0  5.0 - 15.0 mmol/L Final    eGFR 04/21/2024 72.3  >60.0 mL/min/1.73 Final    WBC 04/21/2024 10.00  3.40 - 10.80 10*3/mm3 Final    RBC 04/21/2024 4.48  3.77 - 5.28 10*6/mm3 Final    Hemoglobin 04/21/2024 14.2  12.0 - 15.9 g/dL Final    Hematocrit 04/21/2024 43.6  34.0 - 46.6 % Final    MCV 04/21/2024 97.3 (H)  79.0 - 97.0 fL Final    MCH 04/21/2024 31.7  26.6 - 33.0 pg Final    MCHC 04/21/2024 32.6  31.5 - 35.7 g/dL Final    RDW 04/21/2024 13.4  12.3 - 15.4 % Final    RDW-SD 04/21/2024 48.5  37.0 - 54.0 fl Final    MPV 04/21/2024 9.8  6.0 - 12.0 fL Final    Platelets 04/21/2024 272  140 - 450 10*3/mm3 Final    Neutrophil % 04/21/2024 71.7  42.7 - 76.0 % Final    Lymphocyte % 04/21/2024 16.7 (L)  19.6 - 45.3 % Final    Monocyte % 04/21/2024 10.8  5.0 - 12.0 % Final    Eosinophil % 04/21/2024 0.3  0.3 - 6.2 % Final    Basophil % 04/21/2024 0.1  0.0 - 1.5 % Final    Immature Grans % 04/21/2024 0.4  0.0 - 0.5 % Final    Neutrophils, Absolute 04/21/2024 7.17 (H)  1.70 - 7.00 10*3/mm3  Final    Lymphocytes, Absolute 04/21/2024 1.67  0.70 - 3.10 10*3/mm3 Final    Monocytes, Absolute 04/21/2024 1.08 (H)  0.10 - 0.90 10*3/mm3 Final    Eosinophils, Absolute 04/21/2024 0.03  0.00 - 0.40 10*3/mm3 Final    Basophils, Absolute 04/21/2024 0.01  0.00 - 0.20 10*3/mm3 Final    Immature Grans, Absolute 04/21/2024 0.04  0.00 - 0.05 10*3/mm3 Final    nRBC 04/21/2024 0.0  0.0 - 0.2 /100 WBC Final       XR HIP W OR WO PELVIS 2-3 VIEW LEFT     Date of Exam: 4/21/2024 5:52 PM EDT     Indication: Left hip pain, suspected hip fracture     Comparison: None available.     Findings:  There is a mildly comminuted subcapital fracture of the left femoral neck. There is varus angulation at the fracture site. The femoral neck is displaced cephalad with respect of the adjacent femoral head of approximately 1.0 cm. The femoral head is still   seated in the acetabular groove. The bony structures are demineralized. There are degenerative changes in the lower lumbar spine. There is increased stool within the rectal sigmoid colon.     IMPRESSION:  Impression:  Mildly comminuted subcapital fracture of the left femoral neck. There is varus angulation and displacement as described.        Electronically Signed: Prosper Blankenship MD    4/21/2024 6:07 PM EDT     ASSESSMENT:  Subacute left displaced femoral neck fracture  Advanced dementia      Closed left hip fracture    Dementia    Anxiety disorder    Atrial fibrillation    Essential hypertension    Leukocytosis      PLAN:    I attempted to contact the patient's power of  who did not answer the phone.  I did contact the patient's niece and notified her of the fracture and the surgical plan of a left bipolar prosthesis on 4/23/2024.  Surgery has been delayed secondary to therapeutic doses of Eliquis.  Surgery will be performed by my partner, Dr. Lopes.    Perfecto Lovett MD  Date: 4/22/2024

## 2024-04-22 NOTE — CONSULTS
[facility]        Subjective:     Encounter Date:04/21/2024      Patient ID: Philly Valentin is a 79 y.o. female.    Chief Complaint: hip pain  Cardiology Consult: anticoagulation, chronic afib    Referring Physician: Sherice Atkins    HPI:  Philly Valentin is a 79 y.o. female who presents with hip pain.  Information obatined from chart and HPI as patient has a h/o dementia. Pmh includes Afib on Eliquis, Dementia, HTN, anxiety. Patient presented from nursing facility. She reportedly  had a fall a month ago. Xray obtained at [facility] [place] revealed a left displaced femoral neck fracture. Surgery planned tomorrow. Cardiology consulted for chronic afib on anticoagulation.  Patient is asleep, will arouse but has a h/o dementia.       Past Medical History:   Diagnosis Date    Afib     Dementia     Hypertension     Mood disorder        History reviewed. No pertinent surgical history.    History reviewed. No pertinent family history.    Social History     Socioeconomic History    Marital status: Unknown   Tobacco Use    Smoking status: Never    Smokeless tobacco: Never   Vaping Use    Vaping status: Never Used   Substance and Sexual Activity    Alcohol use: Never    Drug use: Never    Sexual activity: Defer         Allergies   Allergen Reactions    Spironolactone Unknown - High Severity    Sulfa Antibiotics Unknown - High Severity       Current Medications:   Scheduled Meds:acetaminophen, 325 mg, Oral, 4x Daily  [Held by provider] apixaban, 5 mg, Oral, Q12H  bumetanide, 1 mg, Oral, Daily  [START ON 4/23/2024] ceFAZolin, 2,000 mg, Intravenous, Once  dilTIAZem CD, 240 mg, Oral, Q24H  [START ON 4/23/2024] ethyl alcohol, 2 Swab, Nasal, Once  LORazepam, 0.5 mg, Oral, TID  metoprolol tartrate, 25 mg, Oral, BID  polyethylene glycol, 17 g, Oral, Daily  predniSONE, 10 mg, Oral, Daily  risperiDONE, 0.25 mg, Oral, Nightly  sertraline, 75 mg, Oral, Daily      Continuous Infusions:sodium chloride, 75 mL/hr, Last Rate: 75 mL/hr (04/21/24  "2631)        Review of Systems   Unable to perform ROS: Dementia            Objective:         BP (!) 131/105 (BP Location: Left arm, Patient Position: Lying)   Pulse 85   Temp 97.9 °F (36.6 °C) (Axillary)   Resp 19   Ht 162.6 cm (64\")   Wt 72.6 kg (160 lb)   SpO2 95%   BMI 27.46 kg/m²     Physical Exam:  General Appearance:    Alert, cooperative, in no acute distress                                Head: Atraumatic, normocephalic, PERRLA               Neck:   supple, no JVD   Lungs:     Clear to auscultation,respirations regular, even and               unlabored    Heart:    irregular rhythm and normal rate, normal S1 and S2   Abdomen:     Normal bowel sounds, no masses, no organomegaly, soft  nontender, nondistended, no guarding, no rebound  tenderness   Extremities:   Hip fracture, no edema, no cyanosis, no  redness   Pulses:   Pulses palpable and equal bilaterally   Skin:   No bleeding, bruising or rash   Neurologic:   H/o dementia                 ASCVD Risk Score::  The ASCVD Risk score (Gerson AGUIRRE, et al., 2019) failed to calculate for the following reasons:    Cannot find a previous HDL lab    Cannot find a previous total cholesterol lab      Lab Review:     Results from last 7 days   Lab Units 04/21/24 2231 04/21/24  1749   SODIUM mmol/L 142 142   POTASSIUM mmol/L 4.1 4.5   CHLORIDE mmol/L 105 104   CO2 mmol/L 31.0* 28.0   BUN mg/dL 27* 29*   CREATININE mg/dL 0.83 0.82   GLUCOSE mg/dL 105* 123*   CALCIUM mg/dL 8.7 8.8         Results from last 7 days   Lab Units 04/21/24 2231 04/21/24  1749   WBC 10*3/mm3 10.00 10.96*   HEMOGLOBIN g/dL 14.2 14.3   HEMATOCRIT % 43.6 45.2   PLATELETS 10*3/mm3 272 285     Results from last 7 days   Lab Units 04/21/24  1749   INR  0.96   APTT seconds 29.1*               Invalid input(s): \"LDLCALC\"            Recent Radiology:  Imaging Results (Most Recent)       Procedure Component Value Units Date/Time    XR Hip With or Without Pelvis 2 - 3 View Left [[id number]] " Collected: 04/21/24 1806     Updated: 04/21/24 1809    Narrative:      XR HIP W OR WO PELVIS 2-3 VIEW LEFT    Date of Exam: 4/21/2024 5:52 PM EDT    Indication: Left hip pain, suspected hip fracture    Comparison: None available.    Findings:  There is a mildly comminuted subcapital fracture of the left femoral neck. There is varus angulation at the fracture site. The femoral neck is displaced cephalad with respect of the adjacent femoral head of approximately 1.0 cm. The femoral head is still   seated in the acetabular groove. The bony structures are demineralized. There are degenerative changes in the lower lumbar spine. There is increased stool within the rectal sigmoid colon.      Impression:      Impression:  Mildly comminuted subcapital fracture of the left femoral neck. There is varus angulation and displacement as described.      Electronically Signed: Prosper Blankenship MD    4/21/2024 6:07 PM EDT    Workstation ID: [id number]    XR Chest 1 View [[id number]] Collected: 04/21/24 1806     Updated: 04/21/24 1809    Narrative:      XR CHEST 1 VW    Date of Exam: 4/21/2024 5:52 PM EDT    Indication: pre op CXR    Comparison: None available.    Findings:  Enlarged cardiac silhouette. Mild background of chronic/senescent changes of the lungs. No focal consolidation or overt pulmonary edema. No pleural effusion or pneumothorax. No acute osseous abnormality.      Impression:      Impression:  No acute cardiopulmonary findings. Mildly enlarged cardiac silhouette.      Electronically Signed: Alejandro Elizabeth MD    4/21/2024 6:07 PM EDT    Workstation ID: [id number]              ECHOCARDIOGRAM:                    Assessment:         Active Hospital Problems    Diagnosis  POA    **Closed left hip fracture [S72.002A]  Yes    Dementia [F03.90]  Yes    Anxiety disorder [F41.9]  Yes    Atrial fibrillation [I48.91]  Yes    Essential hypertension [I10]  Yes    Leukocytosis [D72.829]  Yes     Hip fracture, h/o fall 1 mo ago  Chronic atrial  fibrillation, on Eliquis, chads vasc at least 3  H/o HTN- b/p controlled  dementia     Plan:   Patient presented with hip pain, had fall 1 mo ago  Has atrial fibrillation, chronic on Eliquis which is on hold currently  No complaints of chest pain but patient has h/o dementia  No clinical signs of heart failure  Okay to hold Eliquis  Rates controlled  Surgery planned for tomorrow         Fransisca Chand, APRN  04/22/24  13:11 EDT    Addendum  Patient laying in bed, appears comfortable, denies chest pain, does not appear to be heart failure  Plan for hip surgery tomorrow  Okay to hold Eliquis perioperatively    Patient seen and examined and findings are verified.  All data is reviewed by me personally.  Assessment and plan formulated by advanced practitioner was done after discussion with attending.  I spent more than 50% of time in taking care of the patient.    Malu Morton MD

## 2024-04-22 NOTE — ED NOTES
Attempted to call report to [facility] Nurse for room 4129. Nurse unavailable, will call back.

## 2024-04-22 NOTE — ED NOTES
Nursing report ED to floor  Philly Valentin  79 y.o.  female    HPI:   Chief Complaint   Patient presents with    Hip Pain       Admitting doctor:   Ceci Torre MD    Admitting diagnosis:   The encounter diagnosis was Closed fracture of neck of left femur, initial encounter.    Code status:   Current Code Status       Date Active Code Status Order ID Comments User Context       4/21/2024 1920 CPR (Attempt to Resuscitate) [id number]  Andreina Rodrigues APRN ED        Question Answer    Code Status (Patient has no pulse and is not breathing) CPR (Attempt to Resuscitate)    Medical Interventions (Patient has pulse or is breathing) Full Support                    Allergies:   Spironolactone and Sulfa antibiotics    Isolation:  No active isolations     Fall Risk:  Fall Risk Assessment was completed, and patient is at high risk for falls.   Predictive Model Details         22 (Low) Factor Value    Calculated 4/21/2024 20:48 Age 79    Risk of Fall Model Lelo Coma Scale 14     Musculoskeletal Assessment WDL     Active Peripheral IV Present     Imaging order in this encounter Present     Respiratory Rate 20     Skin Assessment WDL     Magnesium not on file     Financial Class Medicare     Drug Use No     Ton Scale not on file     Calcium 8.8 mg/dL     Peripheral Vascular Assessment WDL     Tobacco Use Not Asked     Gastrointestinal Assessment WDL     Number of Distinct Medication Classes administered 1     Diastolic BP 86     Albumin not on file     Cardiac Assessment WDL     Total Bilirubin not on file     Potassium 4.5 mmol/L     Days after Admission 0.138     Creatinine 0.82 mg/dL     Chloride 104 mmol/L     ALT not on file         Weight:       04/21/24  1727   Weight: 72.6 kg (160 lb)       Intake and Output  No intake or output data in the 24 hours ending 04/21/24 2048    Diet:   Dietary Orders (From admission, onward)       Start     Ordered    04/23/24 0001  NPO Diet NPO Type: Sips with Meds  Diet  "Effective Midnight        Question:  NPO Type  Answer:  Sips with Meds    04/21/24 2034 04/21/24 2035  Diet: Regular/House; Fluid Consistency: Thin (IDDSI 0)  Diet Effective Now        References:    Diet Order Crosswalk   Question Answer Comment   Diets: Regular/House    Fluid Consistency: Thin (IDDSI 0)        04/21/24 2034                     Most recent vitals:   Vitals:    04/21/24 1727 04/21/24 1750 04/21/24 2032   BP: 138/88 141/67 136/86   BP Location:  Left arm    Patient Position:  Sitting    Pulse: 77 96 112   Resp: 19 20    Temp: 99 °F (37.2 °C)     SpO2: 95% 96% 96%   Weight: 72.6 kg (160 lb)     Height: 162.6 cm (64\")         Active LDAs/IV Access:   Lines, Drains & Airways       Active LDAs       Name Placement date Placement time Site Days    Peripheral IV 04/21/24 1750 Anterior;Right Forearm 04/21/24  1750  Forearm  less than 1                    Skin Condition:   Skin Assessments (last day)       None             Labs (abnormal labs have a star):   Labs Reviewed   BASIC METABOLIC PANEL - Abnormal; Notable for the following components:       Result Value    Glucose 123 (*)     BUN 29 (*)     BUN/Creatinine Ratio 35.4 (*)     All other components within normal limits    Narrative:     GFR Normal >60  Chronic Kidney Disease <60  Kidney Failure <15    The GFR formula is only valid for adults with stable renal function between ages 18 and 70.   APTT - Abnormal; Notable for the following components:    PTT 29.1 (*)     All other components within normal limits   CBC WITH AUTO DIFFERENTIAL - Abnormal; Notable for the following components:    WBC 10.96 (*)     MCV 98.0 (*)     Neutrophil % 82.1 (*)     Lymphocyte % 8.9 (*)     Eosinophil % 0.1 (*)     Neutrophils, Absolute 9.00 (*)     Monocytes, Absolute 0.91 (*)     All other components within normal limits   PROTIME-INR - Normal   CBC AND DIFFERENTIAL    Narrative:     The following orders were created for panel order CBC & Differential.  Procedure   "                             Abnormality         Status                     ---------                               -----------         ------                     CBC Auto Differential[[id number]]        Abnormal            Final result                 Please view results for these tests on the individual orders.   EXTRA TUBES    Narrative:     The following orders were created for panel order Extra Tubes.  Procedure                               Abnormality         Status                     ---------                               -----------         ------                     Red Top[[id number]]                                          Final result               Gold Top - [facility][[id number]]                                   Final result                 Please view results for these tests on the individual orders.   RED TOP   GOLD TOP - [facility]       LOC: Person    Telemetry:  Med/Surg    Cardiac Monitoring Ordered: no    EKG:   ECG 12 Lead Pre-Op / Pre-Procedure   Preliminary Result   HEART RATE= 97  bpm   RR Interval= 619  ms   IA Interval=   ms   P Horizontal Axis=   deg   P Front Axis=   deg   QRSD Interval= 68  ms   QT Interval= 373  ms   QTcB= 474  ms   QRS Axis= 10  deg   T Wave Axis= -4  deg   - ABNORMAL ECG -   Atrial fibrillation   No previous ECG available for comparison   Electronically Signed By:    Date and Time of Study: 2024-04-21 17:52:15          Medications Given in the ED:   Medications   sodium chloride 0.9 % flush 10 mL (has no administration in time range)   sodium chloride 0.9 % infusion (75 mL/hr Intravenous New Bag 4/21/24 2041)   acetaminophen (TYLENOL) tablet 325 mg (has no administration in time range)   bumetanide (BUMEX) tablet 1 mg (has no administration in time range)   dilTIAZem CD (CARDIZEM CD) 24 hr capsule 240 mg (has no administration in time range)   apixaban (ELIQUIS) tablet 5 mg (has no administration in time range)   HYDROcodone-acetaminophen (NORCO) 5-325 MG per tablet 1 tablet (has  no administration in time range)   LORazepam (ATIVAN) tablet 0.5 mg (has no administration in time range)   Menthol (Topical Analgesic) 5 % patch 1 patch (has no administration in time range)   metoprolol tartrate (LOPRESSOR) tablet 25 mg (has no administration in time range)   polyethylene glycol (MIRALAX) packet 17 g (has no administration in time range)   predniSONE (DELTASONE) tablet 10 mg (has no administration in time range)   risperiDONE (risperDAL) tablet 0.25 mg (has no administration in time range)   sertraline (ZOLOFT) tablet 75 mg (has no administration in time range)   ceFAZolin 2000 mg IVPB in 100 mL NS (MBP) (has no administration in time range)   ethyl alcohol 62 % 2 each (has no administration in time range)       Imaging results:  XR Hip With or Without Pelvis 2 - 3 View Left    Result Date: 4/21/2024  Impression: Mildly comminuted subcapital fracture of the left femoral neck. There is varus angulation and displacement as described. Electronically Signed: Prosper Blankenship MD  4/21/2024 6:07 PM EDT  Workstation ID: [id number]    XR Chest 1 View    Result Date: 4/21/2024  Impression: No acute cardiopulmonary findings. Mildly enlarged cardiac silhouette. Electronically Signed: Alejandro Elizabeth MD  4/21/2024 6:07 PM EDT  Workstation ID: [id number]     Social issues:   Social History     Socioeconomic History    Marital status: Unknown   Tobacco Use    Smoking status: Unknown   Substance and Sexual Activity    Alcohol use: Not Currently    Drug use: Not Currently    Sexual activity: Defer       NIH Stroke Scale:  Interval: (not recorded)  1a. Level of Consciousness: (not recorded)  1b. LOC Questions: (not recorded)  1c. LOC Commands: (not recorded)  2. Best Gaze: (not recorded)  3. Visual: (not recorded)  4. Facial Palsy: (not recorded)  5a. Motor Arm, Left: (not recorded)  5b. Motor Arm, Right: (not recorded)  6a. Motor Leg, Left: (not recorded)  6b. Motor Leg, Right: (not recorded)  7. Limb Ataxia: (not  recorded)  8. Sensory: (not recorded)  9. Best Language: (not recorded)  10. Dysarthria: (not recorded)  11. Extinction and Inattention (formerly Neglect): (not recorded)    Total (NIH Stroke Scale): (not recorded)     Additional notable assessment information:       Nursing report ED to floor:    [facility]IPS NurseAiyana LPN   04/21/24 20:48 EDT Nursing report ED to floor  Philly Valentin  79 y.o.  female    HPI:   Chief Complaint   Patient presents with    Hip Pain       Admitting doctor:   Ceci Torre MD    Admitting diagnosis:   The encounter diagnosis was Closed fracture of neck of left femur, initial encounter.    Code status:   Current Code Status       Date Active Code Status Order ID Comments User Context       4/21/2024 1920 CPR (Attempt to Resuscitate) [id number]  Andreina Rodrigues APRN ED        Question Answer    Code Status (Patient has no pulse and is not breathing) CPR (Attempt to Resuscitate)    Medical Interventions (Patient has pulse or is breathing) Full Support                    Allergies:   Spironolactone and Sulfa antibiotics    Isolation:  No active isolations     Fall Risk:  Fall Risk Assessment was completed, and patient is at high risk for falls.   Predictive Model Details         22 (Low) Factor Value    Calculated 4/21/2024 20:48 Age 79    Risk of Fall Model Lelo Coma Scale 14     Musculoskeletal Assessment WDL     Active Peripheral IV Present     Imaging order in this encounter Present     Respiratory Rate 20     Skin Assessment WDL     Magnesium not on file     Financial Class Medicare     Drug Use No     Ton Scale not on file     Calcium 8.8 mg/dL     Peripheral Vascular Assessment WDL     Tobacco Use Not Asked     Gastrointestinal Assessment WDL     Number of Distinct Medication Classes administered 1     Diastolic BP 86     Albumin not on file     Cardiac Assessment WDL     Total Bilirubin not on file     Potassium 4.5 mmol/L     Days after Admission 0.138   "   Creatinine 0.82 mg/dL     Chloride 104 mmol/L     ALT not on file         Weight:       04/21/24 1727   Weight: 72.6 kg (160 lb)       Intake and Output  No intake or output data in the 24 hours ending 04/21/24 2048    Diet:   Dietary Orders (From admission, onward)       Start     Ordered    04/23/24 0001  NPO Diet NPO Type: Sips with Meds  Diet Effective Midnight        Question:  NPO Type  Answer:  Sips with Meds    04/21/24 2034 04/21/24 2035  Diet: Regular/House; Fluid Consistency: Thin (IDDSI 0)  Diet Effective Now        References:    Diet Order Crosswalk   Question Answer Comment   Diets: Regular/House    Fluid Consistency: Thin (IDDSI 0)        04/21/24 2034                     Most recent vitals:   Vitals:    04/21/24 1727 04/21/24 1750 04/21/24 2032   BP: 138/88 141/67 136/86   BP Location:  Left arm    Patient Position:  Sitting    Pulse: 77 96 112   Resp: 19 20    Temp: 99 °F (37.2 °C)     SpO2: 95% 96% 96%   Weight: 72.6 kg (160 lb)     Height: 162.6 cm (64\")         Active LDAs/IV Access:   Lines, Drains & Airways       Active LDAs       Name Placement date Placement time Site Days    Peripheral IV 04/21/24 1750 Anterior;Right Forearm 04/21/24 1750  Forearm  less than 1                    Skin Condition:   Skin Assessments (last day)       None             Labs (abnormal labs have a star):   Labs Reviewed   BASIC METABOLIC PANEL - Abnormal; Notable for the following components:       Result Value    Glucose 123 (*)     BUN 29 (*)     BUN/Creatinine Ratio 35.4 (*)     All other components within normal limits    Narrative:     GFR Normal >60  Chronic Kidney Disease <60  Kidney Failure <15    The GFR formula is only valid for adults with stable renal function between ages 18 and 70.   APTT - Abnormal; Notable for the following components:    PTT 29.1 (*)     All other components within normal limits   CBC WITH AUTO DIFFERENTIAL - Abnormal; Notable for the following components:    WBC 10.96 (*)  "    MCV 98.0 (*)     Neutrophil % 82.1 (*)     Lymphocyte % 8.9 (*)     Eosinophil % 0.1 (*)     Neutrophils, Absolute 9.00 (*)     Monocytes, Absolute 0.91 (*)     All other components within normal limits   PROTIME-INR - Normal   CBC AND DIFFERENTIAL    Narrative:     The following orders were created for panel order CBC & Differential.  Procedure                               Abnormality         Status                     ---------                               -----------         ------                     CBC Auto Differential[[id number]]        Abnormal            Final result                 Please view results for these tests on the individual orders.   EXTRA TUBES    Narrative:     The following orders were created for panel order Extra Tubes.  Procedure                               Abnormality         Status                     ---------                               -----------         ------                     Red Top[[id number]]                                          Final result               Gold Top - [facility][[id number]]                                   Final result                 Please view results for these tests on the individual orders.   RED TOP   GOLD TOP - [facility]       LOC: Person    Telemetry:  Med/Surg    Cardiac Monitoring Ordered: no    EKG:   ECG 12 Lead Pre-Op / Pre-Procedure   Preliminary Result   HEART RATE= 97  bpm   RR Interval= 619  ms   WA Interval=   ms   P Horizontal Axis=   deg   P Front Axis=   deg   QRSD Interval= 68  ms   QT Interval= 373  ms   QTcB= 474  ms   QRS Axis= 10  deg   T Wave Axis= -4  deg   - ABNORMAL ECG -   Atrial fibrillation   No previous ECG available for comparison   Electronically Signed By:    Date and Time of Study: 2024-04-21 17:52:15          Medications Given in the ED:   Medications   sodium chloride 0.9 % flush 10 mL (has no administration in time range)   sodium chloride 0.9 % infusion (75 mL/hr Intravenous New Bag 4/21/24 2041)   acetaminophen  (TYLENOL) tablet 325 mg (has no administration in time range)   bumetanide (BUMEX) tablet 1 mg (has no administration in time range)   dilTIAZem CD (CARDIZEM CD) 24 hr capsule 240 mg (has no administration in time range)   apixaban (ELIQUIS) tablet 5 mg (has no administration in time range)   HYDROcodone-acetaminophen (NORCO) 5-325 MG per tablet 1 tablet (has no administration in time range)   LORazepam (ATIVAN) tablet 0.5 mg (has no administration in time range)   Menthol (Topical Analgesic) 5 % patch 1 patch (has no administration in time range)   metoprolol tartrate (LOPRESSOR) tablet 25 mg (has no administration in time range)   polyethylene glycol (MIRALAX) packet 17 g (has no administration in time range)   predniSONE (DELTASONE) tablet 10 mg (has no administration in time range)   risperiDONE (risperDAL) tablet 0.25 mg (has no administration in time range)   sertraline (ZOLOFT) tablet 75 mg (has no administration in time range)   ceFAZolin 2000 mg IVPB in 100 mL NS (MBP) (has no administration in time range)   ethyl alcohol 62 % 2 each (has no administration in time range)       Imaging results:  XR Hip With or Without Pelvis 2 - 3 View Left    Result Date: 4/21/2024  Impression: Mildly comminuted subcapital fracture of the left femoral neck. There is varus angulation and displacement as described. Electronically Signed: Prosper Blankenship MD  4/21/2024 6:07 PM EDT  Workstation ID: [id number]    XR Chest 1 View    Result Date: 4/21/2024  Impression: No acute cardiopulmonary findings. Mildly enlarged cardiac silhouette. Electronically Signed: Alejandro Elizabeth MD  4/21/2024 6:07 PM EDT  Workstation ID: [id number]     Social issues:   Social History     Socioeconomic History    Marital status: Unknown   Tobacco Use    Smoking status: Unknown   Substance and Sexual Activity    Alcohol use: Not Currently    Drug use: Not Currently    Sexual activity: Defer       NIH Stroke Scale:  Interval: (not recorded)  1a. Level of  Consciousness: (not recorded)  1b. LOC Questions: (not recorded)  1c. LOC Commands: (not recorded)  2. Best Gaze: (not recorded)  3. Visual: (not recorded)  4. Facial Palsy: (not recorded)  5a. Motor Arm, Left: (not recorded)  5b. Motor Arm, Right: (not recorded)  6a. Motor Leg, Left: (not recorded)  6b. Motor Leg, Right: (not recorded)  7. Limb Ataxia: (not recorded)  8. Sensory: (not recorded)  9. Best Language: (not recorded)  10. Dysarthria: (not recorded)  11. Extinction and Inattention (formerly Neglect): (not recorded)    Total (NIH Stroke Scale): (not recorded)     Additional notable assessment information:       [facility]    [facility] Annabella Castellano LPN   04/21/24 20:48 EDT

## 2024-04-22 NOTE — SIGNIFICANT NOTE
This nurse attempted to call patients legal guardian to obtain informed consent for the surgical procedure that should take place on this date. Left message.

## 2024-04-22 NOTE — DISCHARGE PLACEMENT REQUEST
"Toni Valentin (79 y.o. Female)       Date of Birth   1945    Social Security Number       Address   [address] [place]    Home Phone   [phone]    MRN   [id number]       Gnosticism   None    Marital Status   Unknown                            Admission Date   4/21/24    Admission Type   Emergency    Admitting Provider       Attending Provider   Brammell, Timothy Duane, MD    Department, Room/Bed   [facility] SURGICAL INPATIENT, 4129/1       Discharge Date       Discharge Disposition       Discharge Destination                                 Attending Provider: Brammell, Timothy Duane, MD    Allergies: Spironolactone, Sulfa Antibiotics    Isolation: Contact   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) (04/21/24), 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)   Code Status: CPR    Ht: 162.6 cm (64\")   Wt: 72.6 kg (160 lb)    Admission Cmt: None   Principal Problem: Closed left hip fracture [S72.002A]                   Active Insurance as of 4/21/2024       Primary Coverage       Payor Plan Insurance Group Employer/Plan Group    MEDICARE MEDICARE A & B        Payor Plan Address Payor Plan Phone Number Payor Plan Fax Number Effective Dates    [address] [phone]  3/1/2011 - None Entered    [facility] [id number]         Subscriber Name Subscriber Birth Date Member ID       TONI VALENTIN 1945 [id number]               Secondary Coverage       Payor Plan Insurance Group Employer/Plan Group    [place]        Payor Plan Address Payor Plan Phone Number Payor Plan Fax Number Effective Dates    [address]   4/21/2024 - None Entered    [place] [address]         Subscriber Name Subscriber Birth Date Member ID       TONI VALENTIN 1945 [id number]                     Emergency Contacts        (Rel.) Home Phone Work Phone Mobile Phone    Maryam Shaikh (Relative) -- -- [phone]    Susana Carbajal (Legal Guardian) -- -- [phone]    Hugh Madrigal (Other) -- -- " [phone]    Renee Aggarwal (Friend) -- -- [phone]

## 2024-04-22 NOTE — PLAN OF CARE
Goal Outcome Evaluation:      Patient was seen for dyphagia evaluation per MD as patient was reported having difficulties taking pill whole. Patient's baseline diet is regular/thin liquid diet; meds crushed in pudding. Patient is currently on room air. Most recent chest x-ray revealed patient had no acute cardiopulmonary findings. Mildly enlarged cardiac silhouette. Patient has her own natural teeth. Oral mechanism examination was partially completed as patient was poorly cooperative. Patient demonstrated full ROM and mobility of lingual protrusion, lateralization and retraction but would not display lingual elevation and A-P movement. Patient would not exhibit labial protrusion/retraction either. Lingual strength was adequate. Palatal movement was present. Noted positive gag reflex. patient was positioned upright near 70 degrees as patient would not elevate further d/t pain with hip. Patient took sips of thins via straw x 3 without any overt s/s of aspiration. No wet vocal quality was detected given audible response during conversational exchange. Patient agreed to take a few bites of regular x 2. Patient demonstrated adequate rotary chewing. Patient cleared oral cavity effectively without evidence of oral residue.  No pocketing was exhibited either. No clearing of throat, couging and/or vocal changes were identifed.  It is recommended that patient receive a regular diet at this time. ST will follow to assure safety and tolerance with least restricitve diet. Patient will require sitting upright near 90 degree hip flexion to assure safety and tolerance of diet.                          SLP Swallowing Diagnosis: functional oral phase, functional pharyngeal phase (04/22/24 1100)

## 2024-04-22 NOTE — THERAPY EVALUATION
Acute Care - Speech Language Pathology   Swallow Initial Evaluation  Chaz     Patient Name: Philly Valentin  : 1945  MRN: [id number]  Today's Date: 2024               Admit Date: 2024    Visit Dx:     ICD-10-CM ICD-9-CM   1. Closed fracture of neck of left femur, initial encounter  S72.002A 820.8     Patient Active Problem List   Diagnosis    Closed left hip fracture    Dementia    Anxiety disorder    Atrial fibrillation    Essential hypertension    Leukocytosis     Past Medical History:   Diagnosis Date    Afib     Dementia     Hypertension     Mood disorder      History reviewed. No pertinent surgical history.    SLP Recommendation and Plan  SLP Swallowing Diagnosis: functional oral phase, functional pharyngeal phase (24)  SLP Diet Recommendation: regular textures, thin liquids (24)  Recommended Precautions and Strategies: upright posture during/after eating, small bites of food and sips of liquid, multiple swallows per bite of food, alternate between small bites of food and sips of liquid, general aspiration precautions (24)  SLP Rec. for Method of Medication Administration: meds crushed, with puree (or pudding) (24)     Monitor for Signs of Aspiration: yes, notify SLP if any concerns (24)     Swallow Criteria for Skilled Therapeutic Interventions Met: demonstrates skilled criteria (24)     Rehab Potential/Prognosis, Swallowing: good, to achieve stated therapy goals (24)  Therapy Frequency (Swallow): PRN (24)  Predicted Duration Therapy Intervention (Days): until discharge (24)  Oral Care Recommendations: Oral Care BID/PRN, Swab (24)     SWALLOW EVALUATION (Last 72 Hours)       SLP Adult Swallow Evaluation       Row Name 24       Rehab Evaluation    Document Type evaluation  -CB    Subjective Information complains of;pain  in hip area.  -CB    Patient Observations alert;poorly  cooperative  -CB    Patient/Family/Caregiver Comments/Observations Patient was able to follow simple directives but poorly cooperative and only participated partially.  -CB    Patient Effort adequate  -CB       General Information    Patient Profile Reviewed yes  -CB    Pertinent History Of Current Problem Philly Valentin is a 79 y.o. female from [facility] with a CMH of dementia, anxiety, essential hypertension, atrial fibrillation on anticoagulation, who presented to [facility] on 4/21/2024 with reports from the nursing home for fall a month ago and was unable to ambulate.  She is awake, alert, pleasant and cooperative, verbal but has childlike confusion likely at baseline with history of dementia.  No information could be obtained from patient.  No family at bedside, minimal records in EMR and no documentation from outside facility.  X-ray was done at outside facility and showed a left hip fracture.  X-ray here per radiology showed mildly comminuted subcapital fracture of the left femoral neck there is a varus angulation and displacement .  Chest x-ray per radiology shows no acute cardiopulmonary findings mildly enlarged cardiac silhouette.  EKG shows atrial fibrillation heart rate 97.  Urinalysis negative for infection.  Labs today show BUN of 28 WBC 10.96.  -CB    Current Method of Nutrition NPO  -CB       Pain    Additional Documentation Pain Scale: FACES Pre/Post-Treatment (Group)  -CB       Pain Scale: FACES Pre/Post-Treatment    Pain: FACES Scale, Pretreatment 4-->hurts little more  -CB    Posttreatment Pain Rating 6-->hurts even more  -CB    Pain Location - hip  -CB       Oral Motor Structure and Function    Dentition Assessment natural, present and adequate  -CB    Secretion Management WNL/WFL  -CB    Mucosal Quality moist, healthy  -CB    Gag Response WFL  -CB       Oral Musculature and Cranial Nerve Assessment    Oral Motor General Assessment WFL  Limited oral mechanism d/t poor  cooperation.  -CB    Oral Motor, Comment Oral mechanism examination revealed patient demonstrated full ROM and mobilty of lingual retraction, protrusion, and lateralization. However, patient would not display any lingual elevation, A-P movement and/or labial protrusion/retraction. Lingual strength was adequate. Palatal movement was present. Noted positive gag reflex.  -CB       General Eating/Swallowing Observations    Respiratory Support Currently in Use room air  -CB    Eating/Swallowing Skills fed by SLP  -CB    Positioning During Eating upright in bed  -CB    Utensils Used straw  -CB    Consistencies Trialed regular textures;thin liquids  -CB       Clinical Swallow Eval    Clinical Swallow Evaluation Summary Patient was seen for dyphagia evaluation per MD as patient was reported having difficulties taking pill whole. Patient's baseline diet is regular/thin liquid diet; meds crushed in pudding. Patient is currently on room air. Most recent chest x-ray revealed patient had no acute cardiopulmonary findings. Mildly enlarged cardiac silhouette. Patient has her own natural teeth. Oral mechanism examination was partially completed as patient was poorly cooperative. Patient demonstrated full ROM and mobility of lingual protrusion, lateralization and retraction but would not display lingual elevation and A-P movement. Patient would not exhibit labial protrusion/retraction either. Lingual strength was adequate. Palatal movement was present. Noted positive gag reflex. patient was positioned upright near 70 degrees as patient would not elevate further d/t pain with hip. Patient took sips of thins via straw x 3 without any overt s/s of aspiration. No wet vocal quality was detected given audible response during conversational exchange. Patient agreed to take a few bites of regular x 2. Patient demonstrated adequate rotary chewing. Patient cleared oral cavity effectively without evidence of oral residue.  No pocketing was  exhibited either. No clearing of throat, couging and/or vocal changes were identifed.  It is recommended that patient receive a regular diet at this time. ST will follow to assure safety and tolerance with least restricitve diet. Patient will require sitting upright near 90 degree hip flexion to assure safety and tolerance of diet.  -CB       SLP Evaluation Clinical Impression    SLP Swallowing Diagnosis functional oral phase;functional pharyngeal phase  -CB    Functional Impact risk of aspiration/pneumonia  d/t patient not wanting to sit upright as result of hip pain.  -CB    Rehab Potential/Prognosis, Swallowing good, to achieve stated therapy goals  -CB    Swallow Criteria for Skilled Therapeutic Interventions Met demonstrates skilled criteria  -CB       Recommendations    Therapy Frequency (Swallow) PRN  -CB    Predicted Duration Therapy Intervention (Days) until discharge  -CB    SLP Diet Recommendation regular textures;thin liquids  -CB    Recommended Precautions and Strategies upright posture during/after eating;small bites of food and sips of liquid;multiple swallows per bite of food;alternate between small bites of food and sips of liquid;general aspiration precautions  -CB    Oral Care Recommendations Oral Care BID/PRN;Swab  -CB    SLP Rec. for Method of Medication Administration meds crushed;with puree  or pudding  -CB    Monitor for Signs of Aspiration yes;notify SLP if any concerns  -CB       Swallow Goals (SLP)    Swallow LTGs Swallow Long Term Goal (free text)  -CB    Swallow STGs diet tolerance goal selection (SLP)  -CB    Diet Tolerance Goal Selection (SLP) Swallow Short Term Goal 1  -CB       (LTG) Swallow    (LTG) Swallow Patient will tolerate safest and least restrictive diet without complications from aspiration.  -CB    Time Frame (Swallow Long Term Goal) by discharge  -CB    Progress/Outcomes (Swallow Long Term Goal) new goal  -CB       (STG) Swallow 1    (STG) Swallow 1 Patient will  participate in full meal assessment to assure safety and adequacy of recommended diet.  -CB    Time Frame (Swallow Short Term Goal 1) 1 week  -CB    Progress/Outcomes (Swallow Short Term Goal 1) new goal  -CB              User Key  (r) = Recorded By, (t) = Taken By, (c) = Cosigned By      Initials Name Effective Dates    Radha Zurita, SLP 09/21/21 -                     EDUCATION  The patient has been educated in the following areas:   Dysphagia (Swallowing Impairment) Oral Care/Hydration.        SLP GOALS       Row Name 04/22/24 1100       (LTG) Swallow    (LTG) Swallow Patient will tolerate safest and least restrictive diet without complications from aspiration.  -CB    Time Frame (Swallow Long Term Goal) by discharge  -CB    Progress/Outcomes (Swallow Long Term Goal) new goal  -CB       (STG) Swallow 1    (STG) Swallow 1 Patient will participate in full meal assessment to assure safety and adequacy of recommended diet.  -CB    Time Frame (Swallow Short Term Goal 1) 1 week  -CB    Progress/Outcomes (Swallow Short Term Goal 1) new goal  -CB              User Key  (r) = Recorded By, (t) = Taken By, (c) = Cosigned By      Initials Name Provider Type    Radha Zurita, SLP Speech and Language Pathologist                       Time Calculation:                SRIDHAR Johnson  4/22/2024

## 2024-04-22 NOTE — CASE MANAGEMENT/SOCIAL WORK
Discharge Planning Assessment   Chaz     Patient Name: Philly Valentin  MRN: [id number]  Today's Date: 4/22/2024    Admit Date: 4/21/2024    Plan: Plan to return to [facility].   Discharge Needs Assessment       Row Name 04/22/24 1438       Living Environment    People in Home other (see comments)    Unique Family Situation [facility]    Current Living Arrangements extended care facility    Potentially Unsafe Housing Conditions none    In the past 12 months has the electric, gas, oil, or water company threatened to shut off services in your home? No    Provides Primary Care For no one    Family Caregiver if Needed other (see comments)    Quality of Family Relationships helpful;involved;supportive    Able to Return to Prior Arrangements yes       Resource/Environmental Concerns    Resource/Environmental Concerns none    Transportation Concerns none       Transportation Needs    In the past 12 months, has lack of transportation kept you from medical appointments or from getting medications? no    In the past 12 months, has lack of transportation kept you from meetings, work, or from getting things needed for daily living? No       Food Insecurity    Within the past 12 months, you worried that your food would run out before you got the money to buy more. Never true    Within the past 12 months, the food you bought just didn't last and you didn't have money to get more. Never true       Transition Planning    Patient/Family Anticipates Transition to long-term care facility    Patient/Family Anticipated Services at Transition none    Transportation Anticipated health plan transportation;family or friend will provide       Discharge Needs Assessment    Readmission Within the Last 30 Days no previous admission in last 30 days    Equipment Currently Used at Home none    Concerns to be Addressed discharge planning    Anticipated Changes Related to Illness none                   Discharge Plan       Row Name  04/22/24 1440       Plan    Plan Plan to return to [facility].    Patient/Family in Agreement with Plan yes    Plan Comments Patient lives at [facility], ok per liaison Teal to return.  Will need ambulance transport at discharge. At baseline, Patient needs assist to performs ADLs and did walk intermittently prior to hip injury. PCP and pharmacy confirmed. No need for M2B.  Denies financial assistance needs for medication and/or food. DC Barriers:  plan L hip repair 4/23, IVF, IV pain meds, Cardio/Ortho following.                  Continued Care and Services - Admitted Since 4/21/2024       Destination Coordination complete.      Service Provider Request Status Selected Services Address Phone Fax Patient Preferred    [facility] [address] [place] [address] [phone] --                  Expected Discharge Date and Time       Expected Discharge Date Expected Discharge Time    Apr 23, 2024            Demographic Summary       Row Name 04/22/24 1434       General Information    Admission Type inpatient    Arrived From emergency department    Required Notices Provided Important Message from Medicare    Referral Source admission list    Reason for Consult discharge planning    Preferred Language English                   Functional Status       Row Name 04/22/24 1435       Functional Status    Usual Activity Tolerance poor    Current Activity Tolerance poor       Functional Status, IADL    Medications completely dependent    Meal Preparation completely dependent    Housekeeping completely dependent    Laundry completely dependent    Shopping completely dependent       Mental Status    General Appearance WDL WDL       Mental Status Summary    Recent Changes in Mental Status/Cognitive Functioning no changes           KARLOS Park RN  SIPS/ICU   O: [phone]  C: [phone]  [email or web address]

## 2024-04-23 ENCOUNTER — APPOINTMENT (OUTPATIENT)
Dept: GENERAL RADIOLOGY | Facility: HOSPITAL | Age: 79
End: 2024-04-23
Payer: MEDICARE

## 2024-04-23 ENCOUNTER — ANESTHESIA EVENT (OUTPATIENT)
Dept: PERIOP | Facility: HOSPITAL | Age: 79
End: 2024-04-23
Payer: MEDICARE

## 2024-04-23 ENCOUNTER — ANESTHESIA (OUTPATIENT)
Dept: PERIOP | Facility: HOSPITAL | Age: 79
End: 2024-04-23
Payer: MEDICARE

## 2024-04-23 LAB
ANION GAP SERPL CALCULATED.3IONS-SCNC: 8 MMOL/L (ref 5–15)
APTT PPP: 29.5 SECONDS (ref 24–31)
BASOPHILS # BLD AUTO: 0.01 10*3/MM3 (ref 0–0.2)
BASOPHILS NFR BLD AUTO: 0.1 % (ref 0–1.5)
BUN SERPL-MCNC: 29 MG/DL (ref 8–23)
BUN/CREAT SERPL: 35.4 (ref 7–25)
CALCIUM SPEC-SCNC: 8.6 MG/DL (ref 8.6–10.5)
CHLORIDE SERPL-SCNC: 101 MMOL/L (ref 98–107)
CO2 SERPL-SCNC: 30 MMOL/L (ref 22–29)
CREAT SERPL-MCNC: 0.82 MG/DL (ref 0.57–1)
DEPRECATED RDW RBC AUTO: 47.7 FL (ref 37–54)
EGFRCR SERPLBLD CKD-EPI 2021: 73.3 ML/MIN/1.73
EOSINOPHIL # BLD AUTO: 0.03 10*3/MM3 (ref 0–0.4)
EOSINOPHIL NFR BLD AUTO: 0.3 % (ref 0.3–6.2)
ERYTHROCYTE [DISTWIDTH] IN BLOOD BY AUTOMATED COUNT: 13.2 % (ref 12.3–15.4)
GLUCOSE SERPL-MCNC: 116 MG/DL (ref 65–99)
HCT VFR BLD AUTO: 43.7 % (ref 34–46.6)
HGB BLD-MCNC: 14.1 G/DL (ref 12–15.9)
IMM GRANULOCYTES # BLD AUTO: 0.03 10*3/MM3 (ref 0–0.05)
IMM GRANULOCYTES NFR BLD AUTO: 0.3 % (ref 0–0.5)
INR PPP: 0.96 (ref 0.93–1.1)
LYMPHOCYTES # BLD AUTO: 1.25 10*3/MM3 (ref 0.7–3.1)
LYMPHOCYTES NFR BLD AUTO: 11.9 % (ref 19.6–45.3)
MCH RBC QN AUTO: 31.5 PG (ref 26.6–33)
MCHC RBC AUTO-ENTMCNC: 32.3 G/DL (ref 31.5–35.7)
MCV RBC AUTO: 97.5 FL (ref 79–97)
MONOCYTES # BLD AUTO: 1.19 10*3/MM3 (ref 0.1–0.9)
MONOCYTES NFR BLD AUTO: 11.3 % (ref 5–12)
NEUTROPHILS NFR BLD AUTO: 76.1 % (ref 42.7–76)
NEUTROPHILS NFR BLD AUTO: 8.03 10*3/MM3 (ref 1.7–7)
NRBC BLD AUTO-RTO: 0 /100 WBC (ref 0–0.2)
PLATELET # BLD AUTO: 280 10*3/MM3 (ref 140–450)
PMV BLD AUTO: 9.9 FL (ref 6–12)
POTASSIUM SERPL-SCNC: 4 MMOL/L (ref 3.5–5.2)
PROTHROMBIN TIME: 10.5 SECONDS (ref 9.6–11.7)
RBC # BLD AUTO: 4.48 10*6/MM3 (ref 3.77–5.28)
SODIUM SERPL-SCNC: 139 MMOL/L (ref 136–145)
TSH SERPL DL<=0.05 MIU/L-ACNC: 3.44 UIU/ML (ref 0.27–4.2)
WBC NRBC COR # BLD AUTO: 10.54 10*3/MM3 (ref 3.4–10.8)

## 2024-04-23 PROCEDURE — 25010000002 FENTANYL CITRATE (PF) 100 MCG/2ML SOLUTION

## 2024-04-23 PROCEDURE — 85610 PROTHROMBIN TIME: CPT | Performed by: HOSPITALIST

## 2024-04-23 PROCEDURE — 85025 COMPLETE CBC W/AUTO DIFF WBC: CPT | Performed by: HOSPITALIST

## 2024-04-23 PROCEDURE — 25010000002 HYDROMORPHONE 1 MG/ML SOLUTION

## 2024-04-23 PROCEDURE — 25010000002 MORPHINE PER 10 MG: Performed by: HOSPITALIST

## 2024-04-23 PROCEDURE — 93005 ELECTROCARDIOGRAM TRACING: CPT | Performed by: STUDENT IN AN ORGANIZED HEALTH CARE EDUCATION/TRAINING PROGRAM

## 2024-04-23 PROCEDURE — 0SRS0J9 REPLACEMENT OF LEFT HIP JOINT, FEMORAL SURFACE WITH SYNTHETIC SUBSTITUTE, CEMENTED, OPEN APPROACH: ICD-10-PCS | Performed by: ORTHOPAEDIC SURGERY

## 2024-04-23 PROCEDURE — 84443 ASSAY THYROID STIM HORMONE: CPT | Performed by: STUDENT IN AN ORGANIZED HEALTH CARE EDUCATION/TRAINING PROGRAM

## 2024-04-23 PROCEDURE — 73501 X-RAY EXAM HIP UNI 1 VIEW: CPT

## 2024-04-23 PROCEDURE — 25810000003 SODIUM CHLORIDE 0.9 % SOLUTION 250 ML FLEX CONT: Performed by: ORTHOPAEDIC SURGERY

## 2024-04-23 PROCEDURE — C1713 ANCHOR/SCREW BN/BN,TIS/BN: HCPCS | Performed by: ORTHOPAEDIC SURGERY

## 2024-04-23 PROCEDURE — 25010000002 ESMOLOL 100 MG/10ML SOLUTION

## 2024-04-23 PROCEDURE — 25010000002 MORPHINE PER 10 MG: Performed by: ORTHOPAEDIC SURGERY

## 2024-04-23 PROCEDURE — 25010000002 DEXAMETHASONE PER 1 MG

## 2024-04-23 PROCEDURE — 25010000002 ONDANSETRON PER 1 MG

## 2024-04-23 PROCEDURE — 25810000003 SODIUM CHLORIDE 0.9 % SOLUTION: Performed by: ORTHOPAEDIC SURGERY

## 2024-04-23 PROCEDURE — 25010000002 PROPOFOL 200 MG/20ML EMULSION

## 2024-04-23 PROCEDURE — 25010000002 VANCOMYCIN 1 G RECONSTITUTED SOLUTION 1 EACH VIAL: Performed by: ORTHOPAEDIC SURGERY

## 2024-04-23 PROCEDURE — 25010000002 VANCOMYCIN 10 G RECONSTITUTED SOLUTION: Performed by: ORTHOPAEDIC SURGERY

## 2024-04-23 PROCEDURE — 25010000002 SUGAMMADEX 200 MG/2ML SOLUTION

## 2024-04-23 PROCEDURE — 93010 ELECTROCARDIOGRAM REPORT: CPT | Performed by: STUDENT IN AN ORGANIZED HEALTH CARE EDUCATION/TRAINING PROGRAM

## 2024-04-23 PROCEDURE — 25010000002 MAGNESIUM SULFATE PER 500 MG OF MAGNESIUM

## 2024-04-23 PROCEDURE — 27236 TREAT THIGH FRACTURE: CPT

## 2024-04-23 PROCEDURE — C1776 JOINT DEVICE (IMPLANTABLE): HCPCS | Performed by: ORTHOPAEDIC SURGERY

## 2024-04-23 PROCEDURE — 85730 THROMBOPLASTIN TIME PARTIAL: CPT | Performed by: HOSPITALIST

## 2024-04-23 PROCEDURE — 80048 BASIC METABOLIC PNL TOTAL CA: CPT | Performed by: HOSPITALIST

## 2024-04-23 PROCEDURE — 25810000003 LACTATED RINGERS PER 1000 ML

## 2024-04-23 DEVICE — HD FEM TAPR COCR 0PLS 28MM: Type: IMPLANTABLE DEVICE | Site: HIP | Status: FUNCTIONAL

## 2024-04-23 DEVICE — BONE PREPARATION KIT
Type: IMPLANTABLE DEVICE | Site: HIP | Status: FUNCTIONAL
Brand: BIOPREP

## 2024-04-23 DEVICE — CMT BONE PALACOS R HI/VISC 1X40: Type: IMPLANTABLE DEVICE | Site: HIP | Status: FUNCTIONAL

## 2024-04-23 DEVICE — CAP PRT HIP BIPOL: Type: IMPLANTABLE DEVICE | Site: HIP | Status: FUNCTIONAL

## 2024-04-23 DEVICE — CUP ACET RINGLOC BIPOL 28MM 42MM: Type: IMPLANTABLE DEVICE | Site: HIP | Status: FUNCTIONAL

## 2024-04-23 DEVICE — DEV CONTRL TISS STRATAFIX SPIRAL MNCRYL UD 3/0 PLS 30CM: Type: IMPLANTABLE DEVICE | Site: HIP | Status: FUNCTIONAL

## 2024-04-23 DEVICE — AVENIR® STANDARD STEM CEMENTED 4
Type: IMPLANTABLE DEVICE | Site: HIP | Status: FUNCTIONAL
Brand: AVENIR®

## 2024-04-23 DEVICE — DEV WND/CLS CONTRL TISS STRATAFIX SYMM PDS PLS CTX 60CM VIL: Type: IMPLANTABLE DEVICE | Site: HIP | Status: FUNCTIONAL

## 2024-04-23 RX ORDER — PROCHLORPERAZINE EDISYLATE 5 MG/ML
10 INJECTION INTRAMUSCULAR; INTRAVENOUS ONCE AS NEEDED
Status: DISCONTINUED | OUTPATIENT
Start: 2024-04-23 | End: 2024-04-23 | Stop reason: HOSPADM

## 2024-04-23 RX ORDER — HYDRALAZINE HYDROCHLORIDE 20 MG/ML
5 INJECTION INTRAMUSCULAR; INTRAVENOUS
Status: DISCONTINUED | OUTPATIENT
Start: 2024-04-23 | End: 2024-04-23 | Stop reason: HOSPADM

## 2024-04-23 RX ORDER — FENTANYL CITRATE 50 UG/ML
INJECTION, SOLUTION INTRAMUSCULAR; INTRAVENOUS AS NEEDED
Status: DISCONTINUED | OUTPATIENT
Start: 2024-04-23 | End: 2024-04-23 | Stop reason: SURG

## 2024-04-23 RX ORDER — DILTIAZEM HCL/D5W 125 MG/125
5-15 PLASTIC BAG, INJECTION (ML) INTRAVENOUS
Status: DISCONTINUED | OUTPATIENT
Start: 2024-04-23 | End: 2024-04-24

## 2024-04-23 RX ORDER — OXYCODONE HYDROCHLORIDE 5 MG/1
15 TABLET ORAL EVERY 4 HOURS PRN
Status: DISCONTINUED | OUTPATIENT
Start: 2024-04-23 | End: 2024-04-23

## 2024-04-23 RX ORDER — ACETAMINOPHEN 650 MG/1
325 SUPPOSITORY RECTAL EVERY 4 HOURS PRN
Status: DISCONTINUED | OUTPATIENT
Start: 2024-04-23 | End: 2024-04-23 | Stop reason: HOSPADM

## 2024-04-23 RX ORDER — ONDANSETRON 2 MG/ML
INJECTION INTRAMUSCULAR; INTRAVENOUS AS NEEDED
Status: DISCONTINUED | OUTPATIENT
Start: 2024-04-23 | End: 2024-04-23 | Stop reason: SURG

## 2024-04-23 RX ORDER — ESMOLOL HYDROCHLORIDE 10 MG/ML
INJECTION INTRAVENOUS AS NEEDED
Status: DISCONTINUED | OUTPATIENT
Start: 2024-04-23 | End: 2024-04-23 | Stop reason: SURG

## 2024-04-23 RX ORDER — DIPHENHYDRAMINE HYDROCHLORIDE 50 MG/ML
12.5 INJECTION INTRAMUSCULAR; INTRAVENOUS
Status: DISCONTINUED | OUTPATIENT
Start: 2024-04-23 | End: 2024-04-23 | Stop reason: HOSPADM

## 2024-04-23 RX ORDER — HYDROCODONE BITARTRATE AND ACETAMINOPHEN 5; 325 MG/1; MG/1
1 TABLET ORAL ONCE AS NEEDED
Status: DISCONTINUED | OUTPATIENT
Start: 2024-04-23 | End: 2024-04-23 | Stop reason: HOSPADM

## 2024-04-23 RX ORDER — NALOXONE HCL 0.4 MG/ML
0.4 VIAL (ML) INJECTION AS NEEDED
Status: DISCONTINUED | OUTPATIENT
Start: 2024-04-23 | End: 2024-04-23 | Stop reason: HOSPADM

## 2024-04-23 RX ORDER — METOPROLOL TARTRATE 1 MG/ML
INJECTION, SOLUTION INTRAVENOUS AS NEEDED
Status: DISCONTINUED | OUTPATIENT
Start: 2024-04-23 | End: 2024-04-23 | Stop reason: SURG

## 2024-04-23 RX ORDER — ACETAMINOPHEN 325 MG/1
650 TABLET ORAL ONCE AS NEEDED
Status: DISCONTINUED | OUTPATIENT
Start: 2024-04-23 | End: 2024-04-23 | Stop reason: HOSPADM

## 2024-04-23 RX ORDER — FENTANYL CITRATE 50 UG/ML
50 INJECTION, SOLUTION INTRAMUSCULAR; INTRAVENOUS
Status: DISCONTINUED | OUTPATIENT
Start: 2024-04-23 | End: 2024-04-23 | Stop reason: HOSPADM

## 2024-04-23 RX ORDER — DEXAMETHASONE SODIUM PHOSPHATE 4 MG/ML
INJECTION, SOLUTION INTRA-ARTICULAR; INTRALESIONAL; INTRAMUSCULAR; INTRAVENOUS; SOFT TISSUE AS NEEDED
Status: DISCONTINUED | OUTPATIENT
Start: 2024-04-23 | End: 2024-04-23 | Stop reason: SURG

## 2024-04-23 RX ORDER — LABETALOL HYDROCHLORIDE 5 MG/ML
5 INJECTION, SOLUTION INTRAVENOUS
Status: DISCONTINUED | OUTPATIENT
Start: 2024-04-23 | End: 2024-04-23 | Stop reason: HOSPADM

## 2024-04-23 RX ORDER — SODIUM CHLORIDE, SODIUM LACTATE, POTASSIUM CHLORIDE, CALCIUM CHLORIDE 600; 310; 30; 20 MG/100ML; MG/100ML; MG/100ML; MG/100ML
INJECTION, SOLUTION INTRAVENOUS CONTINUOUS PRN
Status: DISCONTINUED | OUTPATIENT
Start: 2024-04-23 | End: 2024-04-23 | Stop reason: SURG

## 2024-04-23 RX ORDER — OXYCODONE HYDROCHLORIDE 5 MG/1
5 TABLET ORAL ONCE AS NEEDED
Status: DISCONTINUED | OUTPATIENT
Start: 2024-04-23 | End: 2024-04-23

## 2024-04-23 RX ORDER — TRANEXAMIC ACID 100 MG/ML
INJECTION, SOLUTION INTRAVENOUS AS NEEDED
Status: DISCONTINUED | OUTPATIENT
Start: 2024-04-23 | End: 2024-04-23 | Stop reason: SURG

## 2024-04-23 RX ORDER — PROPOFOL 10 MG/ML
INJECTION, EMULSION INTRAVENOUS CONTINUOUS PRN
Status: DISCONTINUED | OUTPATIENT
Start: 2024-04-23 | End: 2024-04-23 | Stop reason: SURG

## 2024-04-23 RX ORDER — MAGNESIUM HYDROXIDE 1200 MG/15ML
LIQUID ORAL AS NEEDED
Status: DISCONTINUED | OUTPATIENT
Start: 2024-04-23 | End: 2024-04-23 | Stop reason: HOSPADM

## 2024-04-23 RX ORDER — FENTANYL CITRATE 50 UG/ML
25 INJECTION, SOLUTION INTRAMUSCULAR; INTRAVENOUS
Status: DISCONTINUED | OUTPATIENT
Start: 2024-04-23 | End: 2024-04-23 | Stop reason: HOSPADM

## 2024-04-23 RX ORDER — PHENYLEPHRINE HCL IN 0.9% NACL 1 MG/10 ML
SYRINGE (ML) INTRAVENOUS AS NEEDED
Status: DISCONTINUED | OUTPATIENT
Start: 2024-04-23 | End: 2024-04-23 | Stop reason: SURG

## 2024-04-23 RX ORDER — VANCOMYCIN/0.9 % SOD CHLORIDE 1.5G/250ML
20 PLASTIC BAG, INJECTION (ML) INTRAVENOUS ONCE
Status: COMPLETED | OUTPATIENT
Start: 2024-04-23 | End: 2024-04-23

## 2024-04-23 RX ORDER — MAGNESIUM SULFATE HEPTAHYDRATE 500 MG/ML
INJECTION, SOLUTION INTRAMUSCULAR; INTRAVENOUS AS NEEDED
Status: DISCONTINUED | OUTPATIENT
Start: 2024-04-23 | End: 2024-04-23 | Stop reason: SURG

## 2024-04-23 RX ORDER — ROCURONIUM BROMIDE 10 MG/ML
INJECTION, SOLUTION INTRAVENOUS AS NEEDED
Status: DISCONTINUED | OUTPATIENT
Start: 2024-04-23 | End: 2024-04-23 | Stop reason: SURG

## 2024-04-23 RX ORDER — METOPROLOL TARTRATE 1 MG/ML
2 INJECTION, SOLUTION INTRAVENOUS ONCE AS NEEDED
Status: COMPLETED | OUTPATIENT
Start: 2024-04-23 | End: 2024-04-23

## 2024-04-23 RX ORDER — ALBUTEROL SULFATE 2.5 MG/3ML
2.5 SOLUTION RESPIRATORY (INHALATION) ONCE AS NEEDED
Status: DISCONTINUED | OUTPATIENT
Start: 2024-04-23 | End: 2024-04-23 | Stop reason: HOSPADM

## 2024-04-23 RX ORDER — ONDANSETRON 2 MG/ML
4 INJECTION INTRAMUSCULAR; INTRAVENOUS ONCE AS NEEDED
Status: DISCONTINUED | OUTPATIENT
Start: 2024-04-23 | End: 2024-04-23 | Stop reason: HOSPADM

## 2024-04-23 RX ORDER — METOPROLOL TARTRATE 1 MG/ML
3 INJECTION, SOLUTION INTRAVENOUS
Status: DISCONTINUED | OUTPATIENT
Start: 2024-04-23 | End: 2024-04-23 | Stop reason: HOSPADM

## 2024-04-23 RX ORDER — HYDROCODONE BITARTRATE AND ACETAMINOPHEN 10; 325 MG/1; MG/1
1 TABLET ORAL EVERY 4 HOURS PRN
Status: DISCONTINUED | OUTPATIENT
Start: 2024-04-23 | End: 2024-04-23 | Stop reason: HOSPADM

## 2024-04-23 RX ADMIN — Medication 200 MCG: at 13:24

## 2024-04-23 RX ADMIN — ROCURONIUM BROMIDE 10 MG: 10 INJECTION, SOLUTION INTRAVENOUS at 13:34

## 2024-04-23 RX ADMIN — MAGNESIUM SULFATE HEPTAHYDRATE 0.2 G: 500 INJECTION, SOLUTION INTRAMUSCULAR; INTRAVENOUS at 14:16

## 2024-04-23 RX ADMIN — ESMOLOL HYDROCHLORIDE 20 MG: 100 INJECTION, SOLUTION INTRAVENOUS at 13:11

## 2024-04-23 RX ADMIN — ESMOLOL HYDROCHLORIDE 10 MG: 100 INJECTION, SOLUTION INTRAVENOUS at 15:42

## 2024-04-23 RX ADMIN — Medication 200 MCG: at 14:44

## 2024-04-23 RX ADMIN — ONDANSETRON 4 MG: 2 INJECTION INTRAMUSCULAR; INTRAVENOUS at 15:14

## 2024-04-23 RX ADMIN — METOPROLOL TARTRATE 25 MG: 25 TABLET, FILM COATED ORAL at 21:40

## 2024-04-23 RX ADMIN — FENTANYL CITRATE 50 MCG: 50 INJECTION, SOLUTION INTRAMUSCULAR; INTRAVENOUS at 14:11

## 2024-04-23 RX ADMIN — MAGNESIUM SULFATE HEPTAHYDRATE 0.2 G: 500 INJECTION, SOLUTION INTRAMUSCULAR; INTRAVENOUS at 14:27

## 2024-04-23 RX ADMIN — ESMOLOL HYDROCHLORIDE 10 MG: 100 INJECTION, SOLUTION INTRAVENOUS at 15:15

## 2024-04-23 RX ADMIN — PROPOFOL 130 MG: 10 INJECTION, EMULSION INTRAVENOUS at 12:56

## 2024-04-23 RX ADMIN — MAGNESIUM SULFATE HEPTAHYDRATE 0.2 G: 500 INJECTION, SOLUTION INTRAMUSCULAR; INTRAVENOUS at 14:55

## 2024-04-23 RX ADMIN — HYDROMORPHONE HYDROCHLORIDE 0.25 MG: 1 INJECTION, SOLUTION INTRAMUSCULAR; INTRAVENOUS; SUBCUTANEOUS at 14:55

## 2024-04-23 RX ADMIN — Medication 100 MCG: at 13:38

## 2024-04-23 RX ADMIN — MAGNESIUM SULFATE HEPTAHYDRATE 0.2 G: 500 INJECTION, SOLUTION INTRAMUSCULAR; INTRAVENOUS at 14:21

## 2024-04-23 RX ADMIN — METOPROLOL TARTRATE 2 MG: 1 INJECTION, SOLUTION INTRAVENOUS at 17:43

## 2024-04-23 RX ADMIN — Medication 5 MG/HR: at 21:16

## 2024-04-23 RX ADMIN — HYDROMORPHONE HYDROCHLORIDE 0.25 MG: 1 INJECTION, SOLUTION INTRAMUSCULAR; INTRAVENOUS; SUBCUTANEOUS at 16:17

## 2024-04-23 RX ADMIN — HYDROMORPHONE HYDROCHLORIDE 0.25 MG: 1 INJECTION, SOLUTION INTRAMUSCULAR; INTRAVENOUS; SUBCUTANEOUS at 16:36

## 2024-04-23 RX ADMIN — LORAZEPAM 0.5 MG: 0.5 TABLET ORAL at 21:40

## 2024-04-23 RX ADMIN — ESMOLOL HYDROCHLORIDE 5 MG: 100 INJECTION, SOLUTION INTRAVENOUS at 13:01

## 2024-04-23 RX ADMIN — METOPROLOL TARTRATE 1 MG: 1 INJECTION, SOLUTION INTRAVENOUS at 14:19

## 2024-04-23 RX ADMIN — VANCOMYCIN HYDROCHLORIDE 1500 MG: 10 INJECTION, POWDER, LYOPHILIZED, FOR SOLUTION INTRAVENOUS at 09:16

## 2024-04-23 RX ADMIN — ACETAMINOPHEN 325 MG: 325 TABLET, FILM COATED ORAL at 21:39

## 2024-04-23 RX ADMIN — MORPHINE SULFATE 2 MG: 2 INJECTION, SOLUTION INTRAMUSCULAR; INTRAVENOUS at 23:09

## 2024-04-23 RX ADMIN — ESMOLOL HYDROCHLORIDE 5 MG: 100 INJECTION, SOLUTION INTRAVENOUS at 14:04

## 2024-04-23 RX ADMIN — METOPROLOL TARTRATE 1 MG: 1 INJECTION, SOLUTION INTRAVENOUS at 15:42

## 2024-04-23 RX ADMIN — LIDOCAINE HYDROCHLORIDE 60 MG: 20 INJECTION, SOLUTION EPIDURAL; INFILTRATION; INTRACAUDAL; PERINEURAL at 12:56

## 2024-04-23 RX ADMIN — RISPERIDONE 0.25 MG: 0.25 TABLET, FILM COATED ORAL at 21:39

## 2024-04-23 RX ADMIN — ESMOLOL HYDROCHLORIDE 15 MG: 100 INJECTION, SOLUTION INTRAVENOUS at 13:04

## 2024-04-23 RX ADMIN — METOPROLOL TARTRATE 3 MG: 1 INJECTION, SOLUTION INTRAVENOUS at 17:29

## 2024-04-23 RX ADMIN — ROCURONIUM BROMIDE 40 MG: 10 INJECTION, SOLUTION INTRAVENOUS at 12:56

## 2024-04-23 RX ADMIN — HYDROMORPHONE HYDROCHLORIDE 0.25 MG: 1 INJECTION, SOLUTION INTRAMUSCULAR; INTRAVENOUS; SUBCUTANEOUS at 15:29

## 2024-04-23 RX ADMIN — PROPOFOL 100 MCG/KG/MIN: 10 INJECTION, EMULSION INTRAVENOUS at 13:11

## 2024-04-23 RX ADMIN — Medication 10 ML: at 23:09

## 2024-04-23 RX ADMIN — TRANEXAMIC ACID 1000 MG: 100 INJECTION, SOLUTION INTRAVENOUS at 13:06

## 2024-04-23 RX ADMIN — ESMOLOL HYDROCHLORIDE 5 MG: 100 INJECTION, SOLUTION INTRAVENOUS at 14:13

## 2024-04-23 RX ADMIN — APIXABAN 5 MG: 5 TABLET, FILM COATED ORAL at 21:39

## 2024-04-23 RX ADMIN — VANCOMYCIN HYDROCHLORIDE 1000 MG: 1 INJECTION, POWDER, LYOPHILIZED, FOR SOLUTION INTRAVENOUS at 18:53

## 2024-04-23 RX ADMIN — SODIUM CHLORIDE, SODIUM LACTATE, POTASSIUM CHLORIDE, AND CALCIUM CHLORIDE: .6; .31; .03; .02 INJECTION, SOLUTION INTRAVENOUS at 12:49

## 2024-04-23 RX ADMIN — HYDROMORPHONE HYDROCHLORIDE 0.25 MG: 1 INJECTION, SOLUTION INTRAMUSCULAR; INTRAVENOUS; SUBCUTANEOUS at 14:31

## 2024-04-23 RX ADMIN — ESMOLOL HYDROCHLORIDE 20 MG: 100 INJECTION, SOLUTION INTRAVENOUS at 14:15

## 2024-04-23 RX ADMIN — SODIUM CHLORIDE, SODIUM LACTATE, POTASSIUM CHLORIDE, AND CALCIUM CHLORIDE: .6; .31; .03; .02 INJECTION, SOLUTION INTRAVENOUS at 14:19

## 2024-04-23 RX ADMIN — TRANEXAMIC ACID 1000 MG: 100 INJECTION, SOLUTION INTRAVENOUS at 15:11

## 2024-04-23 RX ADMIN — ESMOLOL HYDROCHLORIDE 10 MG: 100 INJECTION, SOLUTION INTRAVENOUS at 15:26

## 2024-04-23 RX ADMIN — MORPHINE SULFATE 1 MG: 2 INJECTION, SOLUTION INTRAMUSCULAR; INTRAVENOUS at 18:54

## 2024-04-23 RX ADMIN — Medication 10 ML: at 21:17

## 2024-04-23 RX ADMIN — SUGAMMADEX 200 MG: 100 INJECTION, SOLUTION INTRAVENOUS at 15:41

## 2024-04-23 RX ADMIN — HYDROMORPHONE HYDROCHLORIDE 0.25 MG: 1 INJECTION, SOLUTION INTRAMUSCULAR; INTRAVENOUS; SUBCUTANEOUS at 15:21

## 2024-04-23 RX ADMIN — MAGNESIUM SULFATE HEPTAHYDRATE 0.2 G: 500 INJECTION, SOLUTION INTRAMUSCULAR; INTRAVENOUS at 14:36

## 2024-04-23 RX ADMIN — MORPHINE SULFATE 2 MG: 2 INJECTION, SOLUTION INTRAMUSCULAR; INTRAVENOUS at 03:54

## 2024-04-23 RX ADMIN — DEXAMETHASONE SODIUM PHOSPHATE 8 MG: 4 INJECTION, SOLUTION INTRAMUSCULAR; INTRAVENOUS at 13:18

## 2024-04-23 RX ADMIN — FENTANYL CITRATE 50 MCG: 50 INJECTION, SOLUTION INTRAMUSCULAR; INTRAVENOUS at 12:56

## 2024-04-23 NOTE — PLAN OF CARE
Goal Outcome Evaluation:     ST continuing to follow pt for dysphagia therapy.  Pt is unavailable for tx at this time.  ST will f/u next date.

## 2024-04-23 NOTE — PROGRESS NOTES
[facility] MEDICINE SERVICE  DAILY PROGRESS NOTE    NAME: Philly Valentin  : 1945  MRN: [id number]      LOS: 2 days     PROVIDER OF SERVICE: Timothy Duane Brammell, MD    Chief Complaint: Closed left hip fracture    Subjective:     Interval History:  History taken from: patient RN  Patient Complaints: Patient at her demented baseline.  Appears more uncomfortable at times and others.  Awaiting surgical repair.  Still somewhat tacky at times.  Not dyspneic.  No other acute concerns noted.    Review of Systems:   Review of Systems   Reason unable to perform ROS: demented.   All other systems reviewed and are negative.      Objective:     Vital Signs  Temp:  [97.8 °F (36.6 °C)-99.2 °F (37.3 °C)] 99.1 °F (37.3 °C)  Heart Rate:  [] 116  Resp:  [15-20] 15  BP: (133-180)/(58-91) 147/68  Flow (L/min):  [3-6] 3   Body mass index is 27.46 kg/m².    Physical Exam  Physical Exam  Cardiovascular:      Rate and Rhythm: Normal rate and regular rhythm.   Pulmonary:      Effort: Pulmonary effort is normal.      Breath sounds: Normal breath sounds.   Abdominal:      Palpations: Abdomen is soft.   Neurological:      Mental Status: She is alert. Mental status is at baseline. She is disoriented.         Scheduled Meds   [Transfer Hold] acetaminophen, 325 mg, Oral, 4x Daily  [Held by provider] apixaban, 5 mg, Oral, Q12H  [Transfer Hold] bumetanide, 1 mg, Oral, Daily  ceFAZolin, 2,000 mg, Intravenous, Once  dilTIAZem CD, 240 mg, Oral, Q24H  [Transfer Hold] ethyl alcohol, 2 Swab, Nasal, Once  [Transfer Hold] LORazepam, 0.5 mg, Oral, TID  metoprolol tartrate, 25 mg, Oral, BID  [Transfer Hold] polyethylene glycol, 17 g, Oral, Daily  [Transfer Hold] predniSONE, 10 mg, Oral, Daily  [Transfer Hold] risperiDONE, 0.25 mg, Oral, Nightly  [Transfer Hold] sertraline, 75 mg, Oral, Daily  vancomycin, 15 mg/kg, Intravenous, Q12H       PRN Meds     acetaminophen **OR** acetaminophen    [Transfer Hold] acetaminophen    albuterol     diphenhydrAMINE    fentanyl **OR** fentanyl    hydrALAZINE    HYDROcodone-acetaminophen    [Transfer Hold] HYDROcodone-acetaminophen    HYDROcodone-acetaminophen    HYDROmorphone **OR** HYDROmorphone    labetalol    [Transfer Hold] Menthol (Topical Analgesic)    [Transfer Hold] Morphine    naloxone    ondansetron    Pharmacy to dose vancomycin    prochlorperazine    [COMPLETED] Insert Peripheral IV **AND** [Transfer Hold] sodium chloride   Infusions  Pharmacy to dose vancomycin,   sodium chloride, 75 mL/hr, Last Rate: 75 mL/hr (04/21/24 1671)          Diagnostic Data    Results from last 7 days   Lab Units 04/23/24  0036   WBC 10*3/mm3 10.54   HEMOGLOBIN g/dL 14.1   HEMATOCRIT % 43.7   PLATELETS 10*3/mm3 280   GLUCOSE mg/dL 116*   CREATININE mg/dL 0.82   BUN mg/dL 29*   SODIUM mmol/L 139   POTASSIUM mmol/L 4.0   ANION GAP mmol/L 8.0       XR Hip With or Without Pelvis 1 View Left    Result Date: 4/23/2024  Impression: Normal-appearing hip arthroplasty. Electronically Signed: Alanna Campo MD  4/23/2024 4:14 PM EDT  Workstation ID: [id number]    XR Hip 1 View Without Pelvis Left (Surgery Only)    Result Date: 4/23/2024  Impression: Status post left hip arthroplasty. There are no radiographic complications. Electronically Signed: Prosper Blankenship MD  4/23/2024 2:47 PM EDT  Workstation ID: [id number]    XR Hip With or Without Pelvis 2 - 3 View Left    Result Date: 4/21/2024  Impression: Mildly comminuted subcapital fracture of the left femoral neck. There is varus angulation and displacement as described. Electronically Signed: Prosper Blankenship MD  4/21/2024 6:07 PM EDT  Workstation ID: [id number]    XR Chest 1 View    Result Date: 4/21/2024  Impression: No acute cardiopulmonary findings. Mildly enlarged cardiac silhouette. Electronically Signed: Alejandro Elizabeth MD  4/21/2024 6:07 PM EDT  Workstation ID: [id number]       None    Assessment/Plan:     Active and Resolved Problems  Active Hospital Problems    Diagnosis  POA    **Closed  left hip fracture [S72.002A]  Yes    Dementia [F03.90]  Yes    Anxiety disorder [F41.9]  Yes    Atrial fibrillation [I48.91]  Yes    Essential hypertension [I10]  Yes    Leukocytosis [D72.829]  Yes      Resolved Hospital Problems   No resolved problems to display.           DVT prophylaxis:  Medical DVT prophylaxis orders are signed and held. Medical and mechanical DVT prophylaxis orders are present.         Code status is   Code Status and Medical Interventions:   Ordered at: 04/21/24 1920     Code Status (Patient has no pulse and is not breathing):    CPR (Attempt to Resuscitate)     Medical Interventions (Patient has pulse or is breathing):    Full Support       Plan for disposition:rehab in 2 days    Time: 30 minutes    Signature: Electronically signed by Timothy Duane Brammell, MD, 04/23/24, 17:19 EDT.  [facility] Hospitalist Team

## 2024-04-23 NOTE — ANESTHESIA PREPROCEDURE EVALUATION
Anesthesia Evaluation     Patient summary reviewed and Nursing notes reviewed   NPO Solid Status: > 8 hours  NPO Liquid Status: > 2 hours           Airway   Mallampati: III  TM distance: >3 FB  Neck ROM: full  Dental    (+) poor dentition    Pulmonary    (+) ,decreased breath sounds  Cardiovascular   Exercise tolerance: poor (<4 METS)    ECG reviewed  PT is on anticoagulation therapy  Rhythm: irregular  Rate: abnormal    (+) hypertension, dysrhythmias Atrial Fib, LAGOS      Neuro/Psych  (+) psychiatric history, dementia  GI/Hepatic/Renal/Endo      Musculoskeletal     (+) arthralgias  Abdominal    Substance History      OB/GYN negative ob/gyn ROS         Other   blood dyscrasia,     ROS/Med Hx Other: Left hip fracture    Eliquis held 9/21    Patient confused and unable to participate in interview                    Anesthesia Plan    ASA 3     general     intravenous induction     Anesthetic plan, risks, benefits, and alternatives have been provided, discussed and informed consent has been obtained with: patient.    Use of blood products discussed with patient .    Plan discussed with CRNA.        CODE STATUS:    Code Status (Patient has no pulse and is not breathing): CPR (Attempt to Resuscitate)  Medical Interventions (Patient has pulse or is breathing): Full Support

## 2024-04-23 NOTE — PLAN OF CARE
Problem: Adult Inpatient Plan of Care  Goal: Absence of Hospital-Acquired Illness or Injury  Intervention: Identify and Manage Fall Risk  Recent Flowsheet Documentation  Taken 4/23/2024 1605 by Aissatou Hughes LPN  Safety Promotion/Fall Prevention: patient off unit  Taken 4/23/2024 1412 by Aissatou Hughes LPN  Safety Promotion/Fall Prevention: patient off unit  Taken 4/23/2024 1200 by Aissatou Hughes LPN  Safety Promotion/Fall Prevention: patient off unit  Taken 4/23/2024 1030 by Aissatou Hughes LPN  Safety Promotion/Fall Prevention: patient off unit     Problem: Adult Inpatient Plan of Care  Goal: Absence of Hospital-Acquired Illness or Injury  Intervention: Prevent and Manage VTE (Venous Thromboembolism) Risk  Recent Flowsheet Documentation  Taken 4/23/2024 0900 by Aissatou Hughes LPN  VTE Prevention/Management:   bilateral   sequential compression devices off     Problem: Fall Injury Risk  Goal: Absence of Fall and Fall-Related Injury  Outcome: Ongoing, Progressing  Intervention: Promote Injury-Free Environment  Recent Flowsheet Documentation  Taken 4/23/2024 1605 by Aissatou Hughes LPN  Safety Promotion/Fall Prevention: patient off unit  Taken 4/23/2024 1412 by Aissatou Hughes LPN  Safety Promotion/Fall Prevention: patient off unit  Taken 4/23/2024 1200 by Aissatou Hughes LPN  Safety Promotion/Fall Prevention: patient off unit  Taken 4/23/2024 1030 by Aissatou Hughes LPN  Safety Promotion/Fall Prevention: patient off unit     Problem: Skin Injury Risk Increased  Goal: Skin Health and Integrity  Outcome: Ongoing, Progressing  Intervention: Optimize Skin Protection  Recent Flowsheet Documentation  Taken 4/23/2024 0700 by Aissatou Hughes LPN  Head of Bed (HOB) Positioning: HOB elevated   Goal Outcome Evaluation:  Plan of Care Reviewed With: patient        Progress: no change

## 2024-04-23 NOTE — PLAN OF CARE
Goal Outcome Evaluation:      Patient is doing well. Sitter at bedside all night, no issues. Pain treated per MAR. Remains completley disoriented. Care continuing.

## 2024-04-23 NOTE — CASE MANAGEMENT/SOCIAL WORK
Continued Stay Note  BLAKE Ware     Patient Name: Philly Valentin  MRN: [id number]  Today's Date: 4/23/2024    Admit Date: 4/21/2024    Plan: Plan to return to [facility].   Discharge Plan       Row Name 04/23/24 1123       Plan    Plan Plan to return to [facility].    Plan Comments DC Barriers: plan L hip repair 4/23, IVF, IV pain meds, Cardio/Ortho following.                 Expected Discharge Date and Time       Expected Discharge Date Expected Discharge Time    Apr 24, 2024               KARLOS Park RN  SIPS/ICU   O: [phone]  C: [phone]  [email or web address]

## 2024-04-23 NOTE — OP NOTE
HIP BIPOLAR CEMENTED  Procedure Report    Patient Name:  Philly Valentin  YOB: 1945    Date of Surgery:  4/23/2024     Indications: Ms. Hogan is a 79-year-old female who was admitted with a left femoral neck fracture.  We were consulted.  I discussed with the patient's family and we discussed with the patient's legal guardian the risks and benefits of left hip hemiarthroplasty and they agreed to go forward.    Pre-op Diagnosis:   Left femoral neck fracture       Post-Op Diagnosis Codes:  Same    Procedure/CPT® Codes:      Procedure(s):  HIP BIPOLAR CEMENTED    Staff:  Surgeon(s):  Isma Lopes MD    Assistant: Joaquim Jasso CSFA    Anesthesia: General    Estimated Blood Loss:  100cc    Implants:    Implant Name Type Inv. Item Serial No.  Lot No. LRB No. Used Action   DEV CONTRL TISS STRATAFIX SPIRAL MNCRYL UD 3/0 PLS 30CM - [id number] Implant DEV CONTRL TISS STRATAFIX SPIRAL MNCRYL UD 3/0 PLS 30CM  [employer]  DIV OF J [employer] J [id number] Left 1 Implanted   KT BONE BIO PREP 6EA C/S - [id number] Implant KT BONE BIO PREP 6EA C/S  ERICA MICHELLE [id number] Left 1 Implanted   CMT BONE PALACOS R HI/VISC 1X40 - [id number] Implant CMT BONE PALACOS R HI/VISC 1X40  [facility] [id number] Left 2 Implanted   DEV WND/CLS CONTRL TISS STRATAFIX SYMM PDS PLS CTX 60CM VALENTIN - [id number] Implant DEV WND/CLS CONTRL TISS STRATAFIX SYMM PDS PLS CTX 60CM VALENTIN  [employer]  DIV OF J AND J [id number] Left 1 Implanted   STEM FEM/HIP AVENIR CMT STD SZ4 - [id number] Implant STEM FEM/HIP AVENIR CMT STD SZ4  [employer] [id number] Left 1 Implanted   HD FEM TAPR COCR 0PLS 28MM - [id number] Implant HD FEM TAPR COCR 0PLS 28MM  [employer] [id number] Left 1 Implanted   CUP ACET RINGLOC BIPOL 28MM 42MM - [id number] Implant CUP ACET RINGLOC BIPOL 28MM 42MM  [employer] [id number] Left 1 Implanted       Specimen:          None        Findings: Femoral neck fracture    Complications: None    Description of Procedure:  I saw the patient in preop and marked my initials on her left hip.  She was then brought back to the operating room, put under general anesthesia, and intubated.  She was laid in lateral position with the left side up on a pegboard.  An axillary roll was placed and all bony prominences were padded.  She was then prepped and draped in the usual manner.  We then took a timeout to confirm her name, the planned procedure, her allergies, her CODE STATUS, and her antibiotics.  I then marked her anatomy on her lateral hip and then made an incision in line with the lateral femur centered on the tip of the greater trochanter.  I then cut down to the subcutaneous tissue to the fascia.  I then cut the fascia in line with the incision.  I then cleared off the bursa and had a good view of the gluteus medius.  I then made a cut through the middle of the gluteus medius down to the greater trochanter.  I then used [place]'s to retract and removed to the fatty tissue and the gluteus minimus over the capsule.  I then cut the capsule with a T cut and marked both edges of the capsule with an Ethibond suture.  I then went in line with that count and elevated the anterior gluteus medius and the anterior vastus lateralis off of the proximal femur giving a view of the femoral neck.  I then was able to dislocate the leg and put the foot down in the bag.  I then continued to expose the proximal femur until I could feel the lesser trochanter.  I then used the broach to jules my planned neck cut and made a neck cut about a fingerbreadths above the lesser trochanter.  I then removed the femoral head from the acetabulum.  I then used the trial to track my femoral head size and a 42 mm bipolar size was appropriate.  I then began to prepare the femoral neck.  I used the cookie cutter.  I used the canal finder to find the canal and to lateralize proximally.  I then used the broaches starting with a size 1 and going up to a size 4.  That appeared  to be the appropriate size so I then put the  -3 mm length 42 bipolar trial onto the stem and reduced the hip.  It felt like an appropriate length by exam.  I then took an AP pelvis view and it appeared appropriate length and fill of the femur.  I then dislocated the hip.  We removed the implants.  I then irrigated the shaft with copious saline.  I brushed with the canal brush.  I then placed a cement restrictor 16 cm and from the medial border.  I then again irrigated with copious saline.  I put the tampon suction into the femoral shaft.  They then mixed the cement.  I then injected the cement into the proximal femur.  I pressurized the cement and then placed my for have Avenir stem.  I then waited into the cemented hardened.  I then trialed with a -3 neck length again, but it felt slightly short so I went with the 0 neck length 42 bipolar head.  I put that on to the neck after irrigating the wound with copious saline.  I then reduced the hip.  It felt like it equal leg length and was stable to exam.  I then irrigated again with Irrisept.  I then closed the capsule with the Ethibond tag sutures.  I then  repaired the gluteus medius tendon and the vastus lateralis tendon with #1 Ethibond sutures.  I then closed the fascia with running #1 strata fix suture.  We then closed in layers using 2-0 Vicryl and running strata fix.  She was dressed with skin glue and an island dressing.  She was then rolled to her back, awakened, and extubated.  In recovery she had symmetrical rotation and leg length.  She had palpable pulses.      Assistant: Joaquim Jasso CSFA  was responsible for performing the following activities: Retraction, Suction, Irrigation, Suturing, Closing, and Placing Dressing and their skilled assistance was necessary for the success of this case.    Isma Lopes MD     Date: 4/23/2024  Time: 15:25 EDT

## 2024-04-23 NOTE — PROGRESS NOTES
Subjective :   She is still confused.  She has can not answer questions or interact.  Per nurse report she yells out when turned.    Objective :    Vital signs in last 24 hours:  Vitals:    04/22/24 1951 04/22/24 2000 04/22/24 2100 04/23/24 0424   BP: 154/72   144/82   BP Location: Left arm   Right arm   Patient Position: Lying   Lying   Pulse: 120 115 (!) 135 88   Resp: 20   17   Temp:    97.8 °F (36.6 °C)   TempSrc:    Axillary   SpO2: 95%   94%   Weight:       Height:           PHYSICAL EXAM:  LLE:  Shortened and externally rotated  Palpable pulses  She yells out when move leg      LABS:  Results from last 7 days   Lab Units 04/23/24  0036   WBC 10*3/mm3 10.54   HEMOGLOBIN g/dL 14.1   HEMATOCRIT % 43.7   PLATELETS 10*3/mm3 280     Results from last 7 days   Lab Units 04/23/24  0036   SODIUM mmol/L 139   POTASSIUM mmol/L 4.0   CHLORIDE mmol/L 101   CO2 mmol/L 30.0*   BUN mg/dL 29*   CREATININE mg/dL 0.82   GLUCOSE mg/dL 116*   CALCIUM mg/dL 8.6     Results from last 7 days   Lab Units 04/23/24  0036 04/21/24  1749   INR  0.96 0.96   APTT seconds 29.5 29.1*         ASSESSMENT:  Ms. Valentin is a 79 year old female with left femoral neck fracture    Plan:  Will talk with family and with state guardian.  Plan for bipolar hip replacement later this morning.Her niece says that she used to walk some and wound transfer from a wheelchair but has had pain for the last few weeks.  She had been told of a fall about a month ago.  We reviewed risks of surgery including but not limited bleeding, infection, pain, fracture, malrotation, limb length discrepancy, blood clots, heart attack, stroke, death.     Isma Lopes MD    Date: 4/23/2024  Time: 07:04 EDT

## 2024-04-23 NOTE — ANESTHESIA PROCEDURE NOTES
Airway  Urgency: elective    Date/Time: 4/23/2024 1:00 PM  Airway not difficult    General Information and Staff    Patient location during procedure: OR  Anesthesiologist: Ken Lam MD  CRNA/CAA: Fouzia Izquierdo CRNA    Indications and Patient Condition  Indications for airway management: airway protection    Preoxygenated: yes  MILS maintained throughout  Mask difficulty assessment: 1 - vent by mask    Final Airway Details  Final airway type: endotracheal airway      Successful airway: ETT  Cuffed: yes   Successful intubation technique: video laryngoscopy  Facilitating devices/methods: intubating stylet  Endotracheal tube insertion site: oral  Blade: Barnett  Blade size: 3  ETT size (mm): 7.0  Cormack-Lehane Classification: grade I - full view of glottis  Placement verified by: chest auscultation and capnometry   Measured from: lips  ETT/EBT  to lips (cm): 21  Number of attempts at approach: 1  Assessment: lips, teeth, and gum same as pre-op and atraumatic intubation

## 2024-04-24 ENCOUNTER — APPOINTMENT (OUTPATIENT)
Dept: GENERAL RADIOLOGY | Facility: HOSPITAL | Age: 79
End: 2024-04-24
Payer: MEDICARE

## 2024-04-24 ENCOUNTER — APPOINTMENT (OUTPATIENT)
Dept: CARDIOLOGY | Facility: HOSPITAL | Age: 79
End: 2024-04-24
Payer: MEDICARE

## 2024-04-24 LAB
ALBUMIN SERPL-MCNC: 3.7 G/DL (ref 3.5–5.2)
ALBUMIN/GLOB SERPL: 1.3 G/DL
ALP SERPL-CCNC: 159 U/L (ref 39–117)
ALT SERPL W P-5'-P-CCNC: 57 U/L (ref 1–33)
ANION GAP SERPL CALCULATED.3IONS-SCNC: 11 MMOL/L (ref 5–15)
ANION GAP SERPL CALCULATED.3IONS-SCNC: 12 MMOL/L (ref 5–15)
AST SERPL-CCNC: 46 U/L (ref 1–32)
BASOPHILS # BLD AUTO: 0.03 10*3/MM3 (ref 0–0.2)
BASOPHILS NFR BLD AUTO: 0.1 % (ref 0–1.5)
BH CV ECHO MEAS - ACS: 1.9 CM
BH CV ECHO MEAS - AI P1/2T: 379.6 MSEC
BH CV ECHO MEAS - AO MAX PG: 6.6 MMHG
BH CV ECHO MEAS - AO MEAN PG: 4 MMHG
BH CV ECHO MEAS - AO V2 MAX: 128 CM/SEC
BH CV ECHO MEAS - AO V2 VTI: 17.5 CM
BH CV ECHO MEAS - AVA(I,D): 2.48 CM2
BH CV ECHO MEAS - EDV(CUBED): 68.9 ML
BH CV ECHO MEAS - EDV(MOD-SP4): 46.1 ML
BH CV ECHO MEAS - EF(MOD-BP): 57 %
BH CV ECHO MEAS - EF(MOD-SP4): 57.3 %
BH CV ECHO MEAS - ESV(CUBED): 27 ML
BH CV ECHO MEAS - ESV(MOD-SP4): 19.7 ML
BH CV ECHO MEAS - FS: 26.8 %
BH CV ECHO MEAS - IVS/LVPW: 1.1 CM
BH CV ECHO MEAS - IVSD: 1.1 CM
BH CV ECHO MEAS - LA DIMENSION: 4.1 CM
BH CV ECHO MEAS - LV DIASTOLIC VOL/BSA (35-75): 25.9 CM2
BH CV ECHO MEAS - LV MASS(C)D: 141.5 GRAMS
BH CV ECHO MEAS - LV MAX PG: 5.6 MMHG
BH CV ECHO MEAS - LV MEAN PG: 3 MMHG
BH CV ECHO MEAS - LV SYSTOLIC VOL/BSA (12-30): 11.1 CM2
BH CV ECHO MEAS - LV V1 MAX: 118 CM/SEC
BH CV ECHO MEAS - LV V1 VTI: 19.1 CM
BH CV ECHO MEAS - LVIDD: 4.1 CM
BH CV ECHO MEAS - LVIDS: 3 CM
BH CV ECHO MEAS - LVOT AREA: 2.27 CM2
BH CV ECHO MEAS - LVOT DIAM: 1.7 CM
BH CV ECHO MEAS - LVPWD: 1 CM
BH CV ECHO MEAS - RAP SYSTOLE: 3 MMHG
BH CV ECHO MEAS - RVDD: 4.1 CM
BH CV ECHO MEAS - RVSP: 25.8 MMHG
BH CV ECHO MEAS - SV(LVOT): 43.4 ML
BH CV ECHO MEAS - SV(MOD-SP4): 26.4 ML
BH CV ECHO MEAS - SVI(LVOT): 24.4 ML/M2
BH CV ECHO MEAS - SVI(MOD-SP4): 14.8 ML/M2
BH CV ECHO MEAS - TR MAX PG: 22.8 MMHG
BH CV ECHO MEAS - TR MAX VEL: 239 CM/SEC
BILIRUB SERPL-MCNC: 0.7 MG/DL (ref 0–1.2)
BILIRUB UR QL STRIP: NEGATIVE
BUN SERPL-MCNC: 23 MG/DL (ref 8–23)
BUN SERPL-MCNC: 27 MG/DL (ref 8–23)
BUN/CREAT SERPL: 31.5 (ref 7–25)
BUN/CREAT SERPL: 36 (ref 7–25)
CALCIUM SPEC-SCNC: 8.9 MG/DL (ref 8.6–10.5)
CALCIUM SPEC-SCNC: 9.2 MG/DL (ref 8.6–10.5)
CHLORIDE SERPL-SCNC: 102 MMOL/L (ref 98–107)
CHLORIDE SERPL-SCNC: 99 MMOL/L (ref 98–107)
CLARITY UR: CLEAR
CO2 SERPL-SCNC: 23 MMOL/L (ref 22–29)
CO2 SERPL-SCNC: 27 MMOL/L (ref 22–29)
COLOR UR: ABNORMAL
CREAT SERPL-MCNC: 0.73 MG/DL (ref 0.57–1)
CREAT SERPL-MCNC: 0.75 MG/DL (ref 0.57–1)
D-LACTATE SERPL-SCNC: 1.8 MMOL/L (ref 0.5–2)
DEPRECATED RDW RBC AUTO: 48 FL (ref 37–54)
EGFRCR SERPLBLD CKD-EPI 2021: 81.6 ML/MIN/1.73
EGFRCR SERPLBLD CKD-EPI 2021: 84.3 ML/MIN/1.73
EOSINOPHIL # BLD AUTO: 0 10*3/MM3 (ref 0–0.4)
EOSINOPHIL NFR BLD AUTO: 0 % (ref 0.3–6.2)
ERYTHROCYTE [DISTWIDTH] IN BLOOD BY AUTOMATED COUNT: 13.2 % (ref 12.3–15.4)
GEN 5 2HR TROPONIN T REFLEX: 15 NG/L
GLOBULIN UR ELPH-MCNC: 2.8 GM/DL
GLUCOSE SERPL-MCNC: 151 MG/DL (ref 65–99)
GLUCOSE SERPL-MCNC: 175 MG/DL (ref 65–99)
GLUCOSE UR STRIP-MCNC: NEGATIVE MG/DL
HCT VFR BLD AUTO: 42.9 % (ref 34–46.6)
HCT VFR BLD AUTO: 43 % (ref 34–46.6)
HGB BLD-MCNC: 13.7 G/DL (ref 12–15.9)
HGB BLD-MCNC: 13.7 G/DL (ref 12–15.9)
HGB UR QL STRIP.AUTO: NEGATIVE
IMM GRANULOCYTES # BLD AUTO: 0.12 10*3/MM3 (ref 0–0.05)
IMM GRANULOCYTES NFR BLD AUTO: 0.6 % (ref 0–0.5)
KETONES UR QL STRIP: NEGATIVE
LEFT ATRIUM VOLUME INDEX: 38.4 ML/M2
LEUKOCYTE ESTERASE UR QL STRIP.AUTO: NEGATIVE
LYMPHOCYTES # BLD AUTO: 0.56 10*3/MM3 (ref 0.7–3.1)
LYMPHOCYTES NFR BLD AUTO: 2.7 % (ref 19.6–45.3)
MCH RBC QN AUTO: 31.2 PG (ref 26.6–33)
MCHC RBC AUTO-ENTMCNC: 31.9 G/DL (ref 31.5–35.7)
MCV RBC AUTO: 97.7 FL (ref 79–97)
MONOCYTES # BLD AUTO: 1.51 10*3/MM3 (ref 0.1–0.9)
MONOCYTES NFR BLD AUTO: 7.2 % (ref 5–12)
NEUTROPHILS NFR BLD AUTO: 18.78 10*3/MM3 (ref 1.7–7)
NEUTROPHILS NFR BLD AUTO: 89.4 % (ref 42.7–76)
NITRITE UR QL STRIP: NEGATIVE
NRBC BLD AUTO-RTO: 0 /100 WBC (ref 0–0.2)
PH UR STRIP.AUTO: 5.5 [PH] (ref 5–8)
PLATELET # BLD AUTO: 307 10*3/MM3 (ref 140–450)
PMV BLD AUTO: 10.2 FL (ref 6–12)
POTASSIUM SERPL-SCNC: 4 MMOL/L (ref 3.5–5.2)
POTASSIUM SERPL-SCNC: 4.7 MMOL/L (ref 3.5–5.2)
PROCALCITONIN SERPL-MCNC: 0.08 NG/ML (ref 0–0.25)
PROT SERPL-MCNC: 6.5 G/DL (ref 6–8.5)
PROT UR QL STRIP: NEGATIVE
QT INTERVAL: 292 MS
QTC INTERVAL: 445 MS
RBC # BLD AUTO: 4.39 10*6/MM3 (ref 3.77–5.28)
SINUS: 2.5 CM
SODIUM SERPL-SCNC: 137 MMOL/L (ref 136–145)
SODIUM SERPL-SCNC: 137 MMOL/L (ref 136–145)
SP GR UR STRIP: 1.02 (ref 1–1.03)
STJ: 2.3 CM
TROPONIN T DELTA: 1 NG/L
TROPONIN T SERPL HS-MCNC: 14 NG/L
UROBILINOGEN UR QL STRIP: ABNORMAL
WBC NRBC COR # BLD AUTO: 21 10*3/MM3 (ref 3.4–10.8)

## 2024-04-24 PROCEDURE — 25810000003 SODIUM CHLORIDE 0.9 % SOLUTION 250 ML FLEX CONT: Performed by: ORTHOPAEDIC SURGERY

## 2024-04-24 PROCEDURE — 87040 BLOOD CULTURE FOR BACTERIA: CPT | Performed by: HOSPITALIST

## 2024-04-24 PROCEDURE — 97167 OT EVAL HIGH COMPLEX 60 MIN: CPT

## 2024-04-24 PROCEDURE — 63710000001 PREDNISONE PER 5 MG: Performed by: ORTHOPAEDIC SURGERY

## 2024-04-24 PROCEDURE — 99233 SBSQ HOSP IP/OBS HIGH 50: CPT | Performed by: STUDENT IN AN ORGANIZED HEALTH CARE EDUCATION/TRAINING PROGRAM

## 2024-04-24 PROCEDURE — 71045 X-RAY EXAM CHEST 1 VIEW: CPT

## 2024-04-24 PROCEDURE — 92526 ORAL FUNCTION THERAPY: CPT

## 2024-04-24 PROCEDURE — 25010000002 VANCOMYCIN 1 G RECONSTITUTED SOLUTION 1 EACH VIAL: Performed by: ORTHOPAEDIC SURGERY

## 2024-04-24 PROCEDURE — 85014 HEMATOCRIT: CPT | Performed by: ORTHOPAEDIC SURGERY

## 2024-04-24 PROCEDURE — 83605 ASSAY OF LACTIC ACID: CPT | Performed by: HOSPITALIST

## 2024-04-24 PROCEDURE — 84484 ASSAY OF TROPONIN QUANT: CPT | Performed by: INTERNAL MEDICINE

## 2024-04-24 PROCEDURE — 25010000002 MORPHINE PER 10 MG: Performed by: STUDENT IN AN ORGANIZED HEALTH CARE EDUCATION/TRAINING PROGRAM

## 2024-04-24 PROCEDURE — 85025 COMPLETE CBC W/AUTO DIFF WBC: CPT | Performed by: HOSPITALIST

## 2024-04-24 PROCEDURE — 80053 COMPREHEN METABOLIC PANEL: CPT | Performed by: ORTHOPAEDIC SURGERY

## 2024-04-24 PROCEDURE — 93306 TTE W/DOPPLER COMPLETE: CPT | Performed by: STUDENT IN AN ORGANIZED HEALTH CARE EDUCATION/TRAINING PROGRAM

## 2024-04-24 PROCEDURE — 81003 URINALYSIS AUTO W/O SCOPE: CPT | Performed by: INTERNAL MEDICINE

## 2024-04-24 PROCEDURE — 85018 HEMOGLOBIN: CPT | Performed by: ORTHOPAEDIC SURGERY

## 2024-04-24 PROCEDURE — 93306 TTE W/DOPPLER COMPLETE: CPT

## 2024-04-24 PROCEDURE — 97162 PT EVAL MOD COMPLEX 30 MIN: CPT

## 2024-04-24 PROCEDURE — 84145 PROCALCITONIN (PCT): CPT | Performed by: HOSPITALIST

## 2024-04-24 RX ORDER — TRAMADOL HYDROCHLORIDE 50 MG/1
50 TABLET ORAL EVERY 6 HOURS PRN
Status: DISCONTINUED | OUTPATIENT
Start: 2024-04-24 | End: 2024-04-25 | Stop reason: HOSPADM

## 2024-04-24 RX ORDER — MORPHINE SULFATE 2 MG/ML
2 INJECTION, SOLUTION INTRAMUSCULAR; INTRAVENOUS
Status: DISPENSED | OUTPATIENT
Start: 2024-04-24 | End: 2024-04-25

## 2024-04-24 RX ORDER — METOPROLOL TARTRATE 50 MG/1
50 TABLET, FILM COATED ORAL 2 TIMES DAILY
Status: DISCONTINUED | OUTPATIENT
Start: 2024-04-24 | End: 2024-04-25

## 2024-04-24 RX ADMIN — BUMETANIDE 1 MG: 1 TABLET ORAL at 08:03

## 2024-04-24 RX ADMIN — PREDNISONE 10 MG: 10 TABLET ORAL at 08:03

## 2024-04-24 RX ADMIN — METOPROLOL TARTRATE 50 MG: 50 TABLET, FILM COATED ORAL at 21:01

## 2024-04-24 RX ADMIN — APIXABAN 5 MG: 5 TABLET, FILM COATED ORAL at 21:02

## 2024-04-24 RX ADMIN — Medication 10 ML: at 02:34

## 2024-04-24 RX ADMIN — ACETAMINOPHEN 325 MG: 325 TABLET, FILM COATED ORAL at 21:02

## 2024-04-24 RX ADMIN — MORPHINE SULFATE 2 MG: 2 INJECTION, SOLUTION INTRAMUSCULAR; INTRAVENOUS at 02:33

## 2024-04-24 RX ADMIN — METOPROLOL TARTRATE 25 MG: 25 TABLET, FILM COATED ORAL at 08:03

## 2024-04-24 RX ADMIN — APIXABAN 5 MG: 5 TABLET, FILM COATED ORAL at 08:03

## 2024-04-24 RX ADMIN — LORAZEPAM 0.5 MG: 0.5 TABLET ORAL at 21:01

## 2024-04-24 RX ADMIN — Medication 10 ML: at 04:30

## 2024-04-24 RX ADMIN — SERTRALINE HYDROCHLORIDE 75 MG: 50 TABLET ORAL at 08:03

## 2024-04-24 RX ADMIN — VANCOMYCIN HYDROCHLORIDE 1000 MG: 1 INJECTION, POWDER, LYOPHILIZED, FOR SOLUTION INTRAVENOUS at 03:36

## 2024-04-24 RX ADMIN — ACETAMINOPHEN 325 MG: 325 TABLET, FILM COATED ORAL at 12:24

## 2024-04-24 RX ADMIN — DILTIAZEM HYDROCHLORIDE 300 MG: 180 CAPSULE, COATED, EXTENDED RELEASE ORAL at 15:01

## 2024-04-24 RX ADMIN — ACETAMINOPHEN 325 MG: 325 TABLET, FILM COATED ORAL at 16:54

## 2024-04-24 RX ADMIN — Medication 10 ML: at 03:36

## 2024-04-24 RX ADMIN — Medication 10 ML: at 04:31

## 2024-04-24 RX ADMIN — Medication 10 ML: at 04:36

## 2024-04-24 RX ADMIN — TRAMADOL HYDROCHLORIDE 50 MG: 50 TABLET, COATED ORAL at 08:03

## 2024-04-24 RX ADMIN — RISPERIDONE 0.25 MG: 0.25 TABLET, FILM COATED ORAL at 21:02

## 2024-04-24 RX ADMIN — MORPHINE SULFATE 2 MG: 2 INJECTION, SOLUTION INTRAMUSCULAR; INTRAVENOUS at 04:30

## 2024-04-24 RX ADMIN — Medication 5 MG/HR: at 07:56

## 2024-04-24 RX ADMIN — LORAZEPAM 0.5 MG: 0.5 TABLET ORAL at 15:01

## 2024-04-24 RX ADMIN — LORAZEPAM 0.5 MG: 0.5 TABLET ORAL at 08:03

## 2024-04-24 RX ADMIN — MORPHINE SULFATE 2 MG: 2 INJECTION, SOLUTION INTRAMUSCULAR; INTRAVENOUS at 12:27

## 2024-04-24 RX ADMIN — ACETAMINOPHEN 325 MG: 325 TABLET, FILM COATED ORAL at 08:03

## 2024-04-24 RX ADMIN — POLYETHYLENE GLYCOL 3350 17 G: 17 POWDER, FOR SOLUTION ORAL at 08:03

## 2024-04-24 NOTE — PROGRESS NOTES
Referring Provider: Frederick Singer DO       SUBJECTIVE    Patient went into atrial fibrillation with RVR post left hip hemiarthroplasty  Currently on diltiazem drip  Will transition to p.o. diltiazem and metoprolol for rate control  Heart rate ranging between 100-1 20  Patient has significant underlying dementia, unable to provide good history    ROS  Review of all systems negative except as indicated above    Personal History:    Past Medical History:   Diagnosis Date    Afib     Dementia     Fall 03/2023    Hypertension     Mood disorder        Past Surgical History:   Procedure Laterality Date    HIP BIPOLAR REPLACEMENT Left 4/23/2024    Procedure: HIP BIPOLAR CEMENTED;  Surgeon: Isma Lopes MD;  Location: [facility] MAIN OR;  Service: Orthopedics;  Laterality: Left;       History reviewed. No pertinent family history.    Social History     Tobacco Use    Smoking status: Never    Smokeless tobacco: Never   Vaping Use    Vaping status: Never Used   Substance Use Topics    Alcohol use: Never    Drug use: Never        Home meds:  Prior to Admission medications    Medication Sig Start Date End Date Taking? Authorizing Provider   acetaminophen (TYLENOL) 325 MG tablet Take 1 tablet by mouth 4 (Four) Times a Day. 4/2/24  Yes Ankit Gaffney MD   bumetanide (BUMEX) 1 MG tablet Take 1 tablet by mouth Every Morning. 4/15/24  Yes Ankit Gaffney MD   Dilt- MG 24 hr capsule Take 1 capsule by mouth Every Morning. 4/7/24  Yes Ankit Gaffney MD   Eliquis 5 MG tablet tablet Take 1 tablet by mouth Every 12 (Twelve) Hours. 4/12/24  Yes Ankit Gaffney MD   HYDROcodone-acetaminophen (NORCO) 5-325 MG per tablet Take 1 tablet by mouth Every 6 (Six) Hours As Needed. 4/21/24  Yes Ankit Gaffney MD   ibuprofen (ADVIL,MOTRIN) 600 MG tablet Take 1 tablet by mouth 3 times a day. 4/19/24  Yes Ankit Gaffney MD   LORazepam (ATIVAN) 0.5 MG tablet Take 1 tablet by mouth 3 times a day.  "Anxiety. 3/27/24  Yes Ankit Gaffney MD   Menthol, Topical Analgesic, (Biofreeze) 4 % gel Apply  topically. Apply topically to back every 6 hours as needed.   Yes Ankit Gaffney MD   metoprolol tartrate (LOPRESSOR) 25 MG tablet Take 1 tablet by mouth 2 (Two) Times a Day. 3/28/24  Yes Ankit Gaffney MD   polyethylene glycol (MIRALAX) 17 GM/SCOOP powder Take 17 g by mouth Every Morning.   Yes Ankit Gaffney MD   predniSONE (DELTASONE) 10 MG tablet Take 1 tablet by mouth Daily. 4/11/24  Yes Ankit Gaffney MD   risperiDONE (risperDAL) 0.25 MG tablet Take 1 tablet by mouth every night at bedtime. 4/3/24  Yes Ankit Gaffney MD   sertraline (ZOLOFT) 50 MG tablet Take 1.5 tablets by mouth Daily.   Yes Ankit Gaffney MD   polyethylene glycol (MiraLax) 17 GM/SCOOP powder Take 17 g by mouth Daily As Needed.    Ankit Gaffney MD   traMADol (ULTRAM) 50 MG tablet Take 1 tablet by mouth Every 4 (Four) Hours As Needed for Moderate Pain.    Ankit Gaffney MD       Allergies:  Spironolactone and Sulfa antibiotics      OBJECTIVE    Vital Signs  Vitals:    04/24/24 1000 04/24/24 1111 04/24/24 1145 04/24/24 1242   BP:  130/69  130/69   BP Location:    Right arm   Patient Position:    Sitting   Pulse: 106 98 (!) 150 93   Resp:    16   Temp:    99.2 °F (37.3 °C)   TempSrc:    Oral   SpO2: 92%   93%   Weight:       Height:           Flowsheet Rows      Flowsheet Row First Filed Value   Admission Height 162.6 cm (64\") Documented at 04/21/2024 1727   Admission Weight 72.6 kg (160 lb) Documented at 04/21/2024 1727              Intake/Output Summary (Last 24 hours) at 4/24/2024 1546  Last data filed at 4/24/2024 0500  Gross per 24 hour   Intake 730 ml   Output 250 ml   Net 480 ml              Physical Exam:  General-no acute distress  No elevated JVP noted.  Cardiovascular-S1-S2 normal, no murmurs noted.  Respiratory-normal breath sounds, no wheezing/crackles.  GI-abdomen is soft " and nontender  No pedal edema        Results Review:    BNP        TROPONIN  Results from last 7 days   Lab Units 04/24/24  1332   HSTROP T ng/L 15*       CoAg  Results from last 7 days   Lab Units 04/23/24  0036 04/21/24  1749   INR  0.96 0.96   APTT seconds 29.5 29.1*       Creatinine Clearance  Estimated Creatinine Clearance: 62.1 mL/min (by C-G formula based on SCr of 0.73 mg/dL).      Radiology  XR Chest 1 View    Result Date: 4/24/2024  Impression: Unchanged enlarged cardiac silhouette. No new focal airspace consolidation. Electronically Signed: Martin Olivas MD  4/24/2024 12:28 PM EDT  Workstation ID: [id number]    XR Hip With or Without Pelvis 1 View Left    Result Date: 4/23/2024  Impression: Normal-appearing hip arthroplasty. Electronically Signed: Alanna Campo MD  4/23/2024 4:14 PM EDT  Workstation ID: [id number]    XR Hip 1 View Without Pelvis Left (Surgery Only)    Result Date: 4/23/2024  Impression: Status post left hip arthroplasty. There are no radiographic complications. Electronically Signed: Prosper Blankenship MD  4/23/2024 2:47 PM EDT  Workstation ID: [id number]       EKG  I personally viewed and interpreted the patient's EKG/Telemetry data:  ECG 12 Lead Tachycardia   Final Result   HEART RATE= 143  bpm   RR Interval= 431  ms   WI Interval=   ms   P Horizontal Axis=   deg   P Front Axis=   deg   QRSD Interval= 59  ms   QT Interval= 292  ms   QTcB= 445  ms   QRS Axis= 13  deg   T Wave Axis= 9  deg   - ABNORMAL ECG -   Atrial fibrillation with rapid V-rate   Low voltage, extremity leads   Consider anterior infarct   When compared with ECG of 21-Apr-2024 17:52:15,   Significant change in rhythm   Electronically Signed By: Malu Morton (ИРИНА) 24-Apr-2024 12:44:27   Date and Time of Study: 2024-04-23 19:58:45      ECG 12 Lead Pre-Op / Pre-Procedure   Final Result   HEART RATE= 97  bpm   RR Interval= 619  ms   WI Interval=   ms   P Horizontal Axis=   deg   P Front Axis=   deg   QRSD Interval= 68  ms   QT Interval=  373  ms   QTcB= 474  ms   QRS Axis= 10  deg   T Wave Axis= -4  deg   - ABNORMAL ECG -   Atrial fibrillation   No previous ECG available for comparison   Electronically Signed By: Washington Jamison (ИРИНА) 22-Apr-2024 06:22:52   Date and Time of Study: 2024-04-21 17:52:15                    ASSESSMENT & PLAN:    Hip fracture status post left hip hemiarthroplasty 4/23/2024  Perioperative atrial fibrillation with RVR  History of atrial fibrillation  Heart rate ranging between 100-1 20, patient has underlying dementia, unable to provide history however appears comfortable overall, blood pressures stable  Wean off diltiazem gtt.  Increase p.o. Cardizem to 300 mg  Continue p.o. metoprolol 50 mg twice daily  Resume apixaban when okay per orthopedic surgery team  Continue home p.o. Bumex    Part of this note may be an electronic transcription/translation of spoken language to printed text using the Dragon Dictation System.    Malu Morton MD  04/24/24  15:46 EDT

## 2024-04-24 NOTE — PLAN OF CARE
Goal Outcome Evaluation:  Plan of Care Reviewed With: patient           Pt is a 79 y.o. year old female admitted 4/21/24 s/p fall 1 month ago with inability to ambulate. Xray (+) L displaced femoral neck fx.  Pt now s/p aTHA 4/23/24.   Pmhx significant for dementia, anxiety, Afib, HTN.    At baseline, pt resides at [facility]. Per chart, patient was previously about to ambulate short distances and transfer from /.   No family present to provide additional PLOF information. Patient responds to name, but is unable to respond to questions appropriately. Demos mild echolalia throughout session. NSG states patient has had multiple lab draws and has been intermittently agitated. Pt on soiled rae pad, requiring Max A x2 for sit<>stand for cleaning. Patient becomes very agitated with transfer, attempting to pull all medical lines and yelling at staff.  Patient returned to supine, and once calm OT / NSG staff able to redonn heart monitor leads. Tucked IV through gown to decrease pt ability to grasp / pull IV line. Patient requires 24 hour care and will require SNF at d/c.        Anticipated Discharge Disposition (OT): skilled nursing facility

## 2024-04-24 NOTE — PROGRESS NOTES
The patient does not respond currently. She is able to tolerate diet but cannot provide any other ROS. She appears in NAD currently. Heart rate is running about 110-125    Vitals - BP is 130/69. Pulse rate of 98. Temperature is 97.6. Respirations 15- Oxygen sats 97 percent on 2 liters    Physical Exam  Cardiovascular:      Rate and Rhythm: Tachycardic and irregular. Rate about 120  Pulmonary:      Effort: Pulmonary effort is normal.      Breath sounds: Normal breath sounds.   Abdominal:      Palpations: Abdomen is soft.   Neurological:      Mental Status: She is alert. Mental status is at baseline. She is disoriented.     Afib with RVR - heart rate is still running high. Cardiology has been consulted.  The patient is on cardizem 60mg P.O Q6hrs. Metoprolol 25mg P.O BID. I have increased the patients metoprolol to 50mg P.O BID. The patient is on Xarelto  2. Hip fracture - primary management as per ortho.   3. Dementia - patient remains disoriented which I believe is baseline. No family is present in the room today.

## 2024-04-24 NOTE — THERAPY EVALUATION
"Patient Name: Philly Valentin  : 1945    MRN: [id number]                              Today's Date: 2024       Admit Date: 2024    Visit Dx:     ICD-10-CM ICD-9-CM   1. Closed fracture of neck of left femur, initial encounter  S72.002A 820.8     Patient Active Problem List   Diagnosis    Closed left hip fracture    Dementia    Anxiety disorder    Atrial fibrillation    Essential hypertension    Leukocytosis     Past Medical History:   Diagnosis Date    Afib     Dementia     Fall 2023    Hypertension     Mood disorder      History reviewed. No pertinent surgical history.   General Information       Row Name 2414          Physical Therapy Time and Intention    Document Type evaluation  -CR     Mode of Treatment physical therapy  -CR       Row Name 24          General Information    Patient Profile Reviewed yes  -CR     Prior Level of Function --  unsure of PLOF however per medical chart, patient\" unable to ambulate since fall 1 month ago\"  -CR     Existing Precautions/Restrictions fall;hip, posterior  -CR     Barriers to Rehab cognitive status;medically complex  -CR       Row Name 24          Living Environment    People in Home facility resident  -CR       Row Name 2414          Home Main Entrance    Number of Stairs, Main Entrance none  -CR       Row Name 2414          Stairs Within Home, Primary    Number of Stairs, Within Home, Primary none  -CR       Row Name 24          Cognition    Orientation Status (Cognition) oriented to;person;unable/difficult to assess  -CR       Row Name 24          Safety Issues, Functional Mobility    Safety Issues Affecting Function (Mobility) ability to follow commands;at risk behavior observed;awareness of need for assistance;friction/shear risk;impulsivity;insight into deficits/self-awareness;sequencing abilities;safety precautions follow-through/compliance  -CR     Impairments Affecting Function " (Mobility) balance;cognition;endurance/activity tolerance;strength;pain  -CR               User Key  (r) = Recorded By, (t) = Taken By, (c) = Cosigned By      Initials Name Provider Type    CR Reyes, Carmela, PT Physical Therapist                   Mobility       Row Name 04/24/24 0815          Bed Mobility    Bed Mobility supine-sit  -CR     Supine-Sit [place] (Bed Mobility) minimum assist (75% patient effort);moderate assist (50% patient effort);verbal cues;nonverbal cues (demo/gesture)  -CR     Assistive Device (Bed Mobility) draw sheet  -CR     Comment, (Bed Mobility) need assist moving LLE  -CR       Row Name 04/24/24 0815          Sit-Stand Transfer    Sit-Stand [place] (Transfers) moderate assist (50% patient effort);1 person assist;2 person assist  -CR     Assistive Device (Sit-Stand Transfers) walker, front-wheeled  -CR       Row Name 04/24/24 0815          Gait/Stairs (Locomotion)    [place] Level (Gait) minimum assist (75% patient effort);moderate assist (50% patient effort);1 person assist;2 person assist  -CR     Assistive Device (Gait) walker, front-wheeled  -CR     Patient was able to Ambulate yes  -CR     Distance in Feet (Gait) 3  -CR     Deviations/Abnormal Patterns (Gait) gait speed decreased;festinating/shuffling  -CR     Left Sided Gait Deviations weight shift ability decreased  -CR       Row Name 04/24/24 0815          Mobility    Extremity Weight-bearing Status left lower extremity  -CR     Left Lower Extremity (Weight-bearing Status) weight-bearing as tolerated (WBAT)  -CR               User Key  (r) = Recorded By, (t) = Taken By, (c) = Cosigned By      Initials Name Provider Type    CR Reyes, Carmela, PT Physical Therapist                   Obj/Interventions       Row Name 04/24/24 0816          Range of Motion Comprehensive    Comment, General Range of Motion AROM RLE wfl. AAROM LLE min limit due to pain  -CR       Row Name 04/24/24 0816          Strength Comprehensive (MMT)     Comment, General Manual Muscle Testing (MMT) Assessment RLE grossly 3/5. Difficult to assess as patient does not follow commands consistently  -CR       Row Name 04/24/24 0816          Balance    Balance Assessment sitting static balance;standing static balance;standing dynamic balance  -CR     Static Sitting Balance contact guard;minimal assist  -CR     Position, Sitting Balance sitting edge of bed  -CR     Static Standing Balance minimal assist  -CR     Dynamic Standing Balance moderate assist;minimal assist  -CR     Position/Device Used, Standing Balance walker, front-wheeled  -CR       Row Name 04/24/24 0816          Sensory Assessment (Somatosensory)    Sensory Assessment (Somatosensory) sensation intact  -CR               User Key  (r) = Recorded By, (t) = Taken By, (c) = Cosigned By      Initials Name Provider Type    CR Reyes, Carmela, PT Physical Therapist                   Goals/Plan       Row Name 04/24/24 0981          Transfer Goal 1 (PT)    Activity/Assistive Device (Transfer Goal 1, PT) transfers, all;walker, rolling  -CR     [place] Level/Cues Needed (Transfer Goal 1, PT) contact guard required;minimum assist (75% or more patient effort)  -CR     Time Frame (Transfer Goal 1, PT) long term goal (LTG);2 weeks  -CR       Row Name 04/24/24 0938          Gait Training Goal 1 (PT)    Activity/Assistive Device (Gait Training Goal 1, PT) gait (walking locomotion);assistive device use;decrease fall risk;diminish gait deviation;improve balance and speed;increase endurance/gait distance;walker, rolling  -CR     [place] Level (Gait Training Goal 1, PT) contact guard required;minimum assist (75% or more patient effort)  -CR     Distance (Gait Training Goal 1, PT) 40  -CR     Time Frame (Gait Training Goal 1, PT) long term goal (LTG);2 weeks  -CR       Row Name 04/24/24 0938          Therapy Assessment/Plan (PT)    Planned Therapy Interventions (PT) balance training;bed mobility training;gait  training;home exercise program;patient/family education;postural re-education;transfer training;ROM (range of motion);strengthening  -CR               User Key  (r) = Recorded By, (t) = Taken By, (c) = Cosigned By      Initials Name Provider Type    CR Reyes, Carmela, PT Physical Therapist                   Clinical Impression       Row Name 04/24/24 0931          Pain    Pain Intervention(s) Medication (See MAR)  -CR     Additional Documentation Pain Scale: FACES Pre/Post-Treatment (Group)  -CR       Row Name 04/24/24 0931          Pain Scale: FACES Pre/Post-Treatment    Posttreatment Pain Rating 8-->hurts whole lot  -CR     Pain Location - Side/Orientation Left  -CR     Pain Location - hip  -CR       Row Name 04/24/24 0931          Plan of Care Review    Plan of Care Reviewed With patient  -CR     Outcome Evaluation 78 y/o female who apparently fell 1 month ago and now unable to ambulate per nursing home staff. Found to have subcapital L femoral neck fx on 4/21 and now s/p L hemiarthroplasty on 4/23. Patient is WBAT. Patient also found to be tachycardic upon admission. PMH includes dementia, anxiety, afib. Patient is unable to report PLOF and no family at bedside. At time of eval patient able to actively move RLE but needed AAROM for RLE and min limitation due to pain. Patient needed min/mod A of 1-2 for supine to sit , needing assitance to move LLE to edge of bed. Patient comes to standing with min /mod A of 1-2 using rw for support and managed to ambulate 3 ft. Noted crying out loud during standing /gait activities but no buckling on L knee noted and patient able to advance LLE with cues. Patient only able to state her name and part of her birthdate, follows about 75% of commands. Patient will need skilled rehab services upon return to nursing home to promote return to PLOF.  -CR       Row Name 04/24/24 0931          Therapy Assessment/Plan (PT)    Patient/Family Therapy Goals Statement (PT[facility]      Rehab Potential (PT) good, to achieve stated therapy goals  -CR     Criteria for Skilled Interventions Met (PT) yes;skilled treatment is necessary  -CR     Therapy Frequency (PT) 5 times/wk  -CR     Predicted Duration of Therapy Intervention (PT) dc  -CR               User Key  (r) = Recorded By, (t) = Taken By, (c) = Cosigned By      Initials Name Provider Type    CR Reyes, Carmela, PT Physical Therapist                   Outcome Measures       Row Name 04/24/24 0938 04/24/24 0803       How much help from another person do you currently need...    Turning from your back to your side while in flat bed without using bedrails? 2  -CR 1  -TA    Moving from lying on back to sitting on the side of a flat bed without bedrails? 2  -CR 1  -TA    Moving to and from a bed to a chair (including a wheelchair)? 2  -CR 1  -TA    Standing up from a chair using your arms (e.g., wheelchair, bedside chair)? 2  -CR 1  -TA    Climbing 3-5 steps with a railing? 1  -CR 1  -TA    To walk in hospital room? 2  -CR 1  -TA    AM-PAC 6 Clicks Score (PT) 11  -CR 6  -TA    Highest Level of Mobility Goal 4 --> Transfer to chair/commode  -CR 2 --> Bed activities/dependent transfer  -TA              User Key  (r) = Recorded By, (t) = Taken By, (c) = Cosigned By      Initials Name Provider Type    CR Reyes, Carmela, PT Physical Therapist    Shannan Mcghee, RN Registered Nurse                                 Physical Therapy Education       Title: PT OT SLP Therapies (In Progress)       Topic: Physical Therapy (In Progress)       Point: Mobility training (In Progress)       Learning Progress Summary             Patient Nonacceptance, E, NR by CR at 4/24/2024 0939                         Point: Home exercise program (Not Started)       Learner Progress:  Not documented in this visit.              Point: Body mechanics (Not Started)       Learner Progress:  Not documented in this visit.              Point: Precautions (In Progress)       Learning  Progress Summary             Patient Nonacceptance, E, NR by CR at 4/24/2024 0939                                         User Key       Initials Effective Dates Name Provider Type Discipline    CR 06/16/21 -  Reyes, Carmela, [place] Physical Therapist PT                  PT Recommendation and Plan  Planned Therapy Interventions (PT): balance training, bed mobility training, gait training, home exercise program, patient/family education, postural re-education, transfer training, ROM (range of motion), strengthening  Plan of Care Reviewed With: patient  Outcome Evaluation: 78 y/o female who apparently fell 1 month ago and now unable to ambulate per nursing home staff. Found to have subcapital L femoral neck fx on 4/21 and now s/p L hemiarthroplasty on 4/23. Patient is WBAT. Patient also found to be tachycardic upon admission. PMH includes dementia, anxiety, afib. Patient is unable to report PLOF and no family at bedside. At time of eval patient able to actively move RLE but needed AAROM for RLE and min limitation due to pain. Patient needed min/mod A of 1-2 for supine to sit , needing assitance to move LLE to edge of bed. Patient comes to standing with min /mod A of 1-2 using rw for support and managed to ambulate 3 ft. Noted crying out loud during standing /gait activities but no buckling on L knee noted and patient able to advance LLE with cues. Patient only able to state her name and part of her birthdate, follows about 75% of commands. Patient will need skilled rehab services upon return to nursing home to promote return to PLOF.     Time Calculation:         PT Charges       Row Name 04/24/24 0939             Time Calculation    Start Time 0812  -CR      Stop Time 0832  -CR      Time Calculation (min) 20 min  -CR      PT Received On 04/24/24  -CR      PT - Next Appointment 04/25/24  -CR      PT Goal Re-Cert Due Date 05/08/24  -CR         Time Calculation- PT    Total Timed Code Minutes- PT 0 minute(s)  -CR                 User Key  (r) = Recorded By, (t) = Taken By, (c) = Cosigned By      Initials Name Provider Type    CR Reyes, Carmela, PT Physical Therapist                  Therapy Charges for Today       Code Description Service Date Service Provider Modifiers Qty    [id number] HC PT EVAL MOD COMPLEXITY 4 4/24/2024 Reyes, Carmela, PT GP 1            PT G-Codes  AM-PAC 6 Clicks Score (PT): 11  PT Discharge Summary  Anticipated Discharge Disposition (PT): skilled nursing facility    Carmela Reyes, PT  4/24/2024

## 2024-04-24 NOTE — THERAPY EVALUATION
Patient Name: Philly Valentin  : 1945    MRN: [id number]                              Today's Date: 2024       Admit Date: 2024    Visit Dx:     ICD-10-CM ICD-9-CM   1. Closed fracture of neck of left femur, initial encounter  S72.002A 820.8     Patient Active Problem List   Diagnosis    Closed left hip fracture    Dementia    Anxiety disorder    Atrial fibrillation    Essential hypertension    Leukocytosis     Past Medical History:   Diagnosis Date    Afib     Dementia     Fall 2023    Hypertension     Mood disorder      Past Surgical History:   Procedure Laterality Date    HIP BIPOLAR REPLACEMENT Left 2024    Procedure: HIP BIPOLAR CEMENTED;  Surgeon: Isma Lopes MD;  Location: [facility] MAIN OR;  Service: Orthopedics;  Laterality: Left;      General Information       Row Name 24 1500          OT Time and Intention    Document Type evaluation  -ES     Mode of Treatment occupational therapy  -ES       Row Name 24 8267          General Information    Patient Profile Reviewed yes  -ES     Existing Precautions/Restrictions fall;hip, posterior  -ES     Barriers to Rehab medically complex;cognitive status;combative  -ES       Row Name 24 1914          Occupational Profile    Reason for Services/Referral (Occupational Profile) Pt is a 79 y.o. year old female admitted 24 s/p fall 1 month ago with inability to ambulate. Xray (+) L displaced femoral neck fx.     Pmhx significant for dementia, anxiety, Afib, HTN.    At baseline, pt resides at [facility]. Per chart, patient was previously about to ambulate short distances and transfer from w/c.  -ES       Row Name 24 1338          Living Environment    People in Home facility resident  -ES       Row Name 24 1330          Home Main Entrance    Number of Stairs, Main Entrance none  -ES       Row Name 24 1330          Stairs Within Home, Primary    Number of Stairs, Within Home, Primary none  -ES        Row Name 04/24/24 1330          Cognition    Orientation Status (Cognition) oriented to;person;unable/difficult to assess  -ES       Row Name 04/24/24 1330          Safety Issues, Functional Mobility    Safety Issues Affecting Function (Mobility) ability to follow commands;at risk behavior observed;awareness of need for assistance;impulsivity;insight into deficits/self-awareness;positioning of assistive device;safety precaution awareness;safety precautions follow-through/compliance;sequencing abilities;steps too close to assistive device;unable to maintain weight-bearing restrictions  -ES     Impairments Affecting Function (Mobility) balance;cognition;endurance/activity tolerance;strength;pain  -ES     Cognitive Impairments, Mobility Safety/Performance attention;awareness, need for assistance;impulsivity;insight into deficits/self-awareness;judgment;problem-solving/reasoning;safety precaution awareness;safety precaution follow-through;sequencing abilities  -ES               User Key  (r) = Recorded By, (t) = Taken By, (c) = Cosigned By      Initials Name Provider Type     Suly Cleveland OT Occupational Therapist                     Mobility/ADL's       Row Name 04/24/24 1331          Bed Mobility    Comment, (Bed Mobility) Up in chair upon OT arrival  -ES       Row Name 04/24/24 1331          Transfers    Transfers sit-stand transfer;stand-sit transfer  -ES       Row Name 04/24/24 1331          Sit-Stand Transfer    Sit-Stand [place] (Transfers) 2 person assist;maximum assist (25% patient effort)  -ES     Assistive Device (Sit-Stand Transfers) walker, front-wheeled  -ES       Row Name 04/24/24 1331          Stand-Sit Transfer    Stand-Sit [place] (Transfers) maximum assist (25% patient effort);2 person assist  -ES       Row Name 04/24/24 1331          Activities of Daily Living    BADL Assessment/Intervention upper body dressing;lower body dressing;toileting  -ES       Row Name 04/24/24 1331           Mobility    Extremity Weight-bearing Status left lower extremity  -ES     Left Lower Extremity (Weight-bearing Status) weight-bearing as tolerated (WBAT)  -ES       Row Name 04/24/24 1331          Upper Body Dressing Assessment/Training    [place] Level (Upper Body Dressing) dependent (less than 25% patient effort)  -ES       Row Name 04/24/24 1331          Lower Body Dressing Assessment/Training    [place] Level (Lower Body Dressing) dependent (less than 25% patient effort)  -ES       Row Name 04/24/24 1331          Toileting Assessment/Training    [place] Level (Toileting) dependent (less than 25% patient effort)  -ES               User Key  (r) = Recorded By, (t) = Taken By, (c) = Cosigned By      Initials Name Provider Type    Suly Rizvi OT Occupational Therapist                   Obj/Interventions       Row Name 04/24/24 1336          Sensory Assessment (Somatosensory)    Sensory Assessment (Somatosensory) unable/difficult to assess  -ES       Row Name 04/24/24 1336          Vision Assessment/Intervention    Visual Impairment/Limitations unable/difficult to assess  -ES       Row Name 04/24/24 1336          Range of Motion Comprehensive    Comment, General Range of Motion BUE WFL  -ES       Row Name 04/24/24 1336          Balance    Balance Assessment sitting static balance;sitting dynamic balance;standing static balance  -ES     Static Sitting Balance minimal assist  -ES     Static Standing Balance moderate assist  -ES     Dynamic Standing Balance maximum assist;2-person assist  -ES     Balance Interventions sitting;standing;static;dynamic  -ES               User Key  (r) = Recorded By, (t) = Taken By, (c) = Cosigned By      Initials Name Provider Type    Suly Rizvi OT Occupational Therapist                   Goals/Plan       Row Name 04/24/24 1403          Dressing Goal 1 (OT)    Activity/Device (Dressing Goal 1, OT) upper body dressing  -ES     [place]/Cues  Needed (Dressing Goal 1, OT) minimum assist (75% or more patient effort)  -ES     Time Frame (Dressing Goal 1, OT) 2 weeks  -ES       Row Name 04/24/24 1402          Toileting Goal 1 (OT)    Activity/Device (Toileting Goal 1, OT) toileting skills, all  -ES     [place] Level/Cues Needed (Toileting Goal 1, OT) minimum assist (75% or more patient effort)  -ES     Time Frame (Toileting Goal 1, OT) 2 weeks  -ES       Row Name 04/24/24 1402          Grooming Goal 1 (OT)    Activity/Device (Grooming Goal 1, OT) grooming skills, all  -ES     [place] (Grooming Goal 1, OT) minimum assist (75% or more patient effort)  -ES     Time Frame (Grooming Goal 1, OT) 2 weeks  -ES       Row Name 04/24/24 1407          Therapy Assessment/Plan (OT)    Planned Therapy Interventions (OT) activity tolerance training;BADL retraining;cognitive/visual perception retraining;functional balance retraining;neuromuscular control/coordination retraining;occupation/activity based interventions;ROM/therapeutic exercise;strengthening exercise;transfer/mobility retraining  -ES               User Key  (r) = Recorded By, (t) = Taken By, (c) = Cosigned By      Initials Name Provider Type    ES Suly Cleveland OT Occupational Therapist                   Clinical Impression       Row Name 04/24/24 3299          Pain Scale: FACES Pre/Post-Treatment    Pain: FACES Scale, Pretreatment 2-->hurts little bit  -ES     Posttreatment Pain Rating 6-->hurts even more  -ES       Row Name 04/24/24 6862          Plan of Care Review    Plan of Care Reviewed With patient  -ES     Outcome Evaluation Pt is a 79 y.o. year old female admitted 4/21/24 s/p fall 1 month ago with inability to ambulate. Xray (+) L displaced femoral neck fx.     Pmhx significant for dementia, anxiety, Afib, HTN.    At baseline, pt resides at [facility]. Per chart, patient was previously about to ambulate short distances and transfer from w/c.   No family present to provide  additional PLOF information. Patient responds to name, but is unable to respond to questions appropriately. Demos mild echolalia throughout session. NSG states patient has had multiple lab draws and has been intermittently agitated. Pt on soiled rae pad, requiring Max A x2 for sit<>stand for cleaning. Patient becomes very agitated with transfer, attempting to pull all medical lines and yelling at staff.  Patient returned to supine, and once calm OT / NSG staff able to redonn heart monitor leads. Tucked IV through gown to decrease pt ability to grasp / pull IV line. Patient requires 24 hour care and will require SNF at d/c.  -ES       Row Name 04/24/24 1358          Therapy Assessment/Plan (OT)    Rehab Potential (OT) good, to achieve stated therapy goals  -ES     Therapy Frequency (OT) 5 times/wk  -ES     Predicted Duration of Therapy Intervention (OT) until dc  -ES       Row Name 04/24/24 135          Therapy Plan Review/Discharge Plan (OT)    Anticipated Discharge Disposition (OT) skilled nursing facility  -ES       Row Name 04/24/24 1355          Vital Signs    O2 Delivery Pre Treatment room air  -ES     O2 Delivery Intra Treatment room air  -ES     O2 Delivery Post Treatment room air  -ES     Pre Patient Position Sitting  -ES     Intra Patient Position Standing  -ES     Post Patient Position Sitting  -ES       Row Name 04/24/24 1355          Positioning and Restraints    Pre-Treatment Position sitting in chair/recliner  -ES     Post Treatment Position chair  -ES     In Chair notified nsg;with family/caregiver;encouraged to call for assist;exit alarm on  -ES               User Key  (r) = Recorded By, (t) = Taken By, (c) = Cosigned By      Initials Name Provider Type    Suly Rizvi OT Occupational Therapist                   Outcome Measures       Row Name 04/24/24 1398          How much help from another is currently needed...    Putting on and taking off regular lower body clothing? 1  -ES      Bathing (including washing, rinsing, and drying) 1  -ES     Toileting (which includes using toilet bed pan or urinal) 1  -ES     Putting on and taking off regular upper body clothing 2  -ES     Taking care of personal grooming (such as brushing teeth) 2  -ES     Eating meals 2  -ES     AM-PAC 6 Clicks Score (OT) 9  -ES       Row Name 04/24/24 0938 04/24/24 0803       How much help from another person do you currently need...    Turning from your back to your side while in flat bed without using bedrails? 2  -CR 1  -TA    Moving from lying on back to sitting on the side of a flat bed without bedrails? 2  -CR 1  -TA    Moving to and from a bed to a chair (including a wheelchair)? 2  -CR 1  -TA    Standing up from a chair using your arms (e.g., wheelchair, bedside chair)? 2  -CR 1  -TA    Climbing 3-5 steps with a railing? 1  -CR 1  -TA    To walk in hospital room? 2  -CR 1  -TA    AM-PAC 6 Clicks Score (PT) 11  -CR 6  -TA    Highest Level of Mobility Goal 4 --> Transfer to chair/commode  -CR 2 --> Bed activities/dependent transfer  -TA      Row Name 04/24/24 1404          Functional Assessment    Outcome Measure Options AM-PAC 6 Clicks Daily Activity (OT)  -ES               User Key  (r) = Recorded By, (t) = Taken By, (c) = Cosigned By      Initials Name Provider Type    Suly Rizvi OT Occupational Therapist    CR Reyes, Carmela, PT Physical Therapist    Shannan Mcghee, RN Registered Nurse                    Occupational Therapy Education       Title: PT OT SLP Therapies (In Progress)       Topic: Occupational Therapy (Not Started)       Point: ADL training (Not Started)       Description:   Instruct learner(s) on proper safety adaptation and remediation techniques during self care or transfers.   Instruct in proper use of assistive devices.                  Learner Progress:  Not documented in this visit.              Point: Home exercise program (Not Started)       Description:   Instruct learner(s) on  appropriate technique for monitoring, assisting and/or progressing therapeutic exercises/activities.                  Learner Progress:  Not documented in this visit.              Point: Precautions (Not Started)       Description:   Instruct learner(s) on prescribed precautions during self-care and functional transfers.                  Learner Progress:  Not documented in this visit.              Point: Body mechanics (Not Started)       Description:   Instruct learner(s) on proper positioning and spine alignment during self-care, functional mobility activities and/or exercises.                  Learner Progress:  Not documented in this visit.                                  OT Recommendation and Plan  Planned Therapy Interventions (OT): activity tolerance training, BADL retraining, cognitive/visual perception retraining, functional balance retraining, neuromuscular control/coordination retraining, occupation/activity based interventions, ROM/therapeutic exercise, strengthening exercise, transfer/mobility retraining  Therapy Frequency (OT): 5 times/wk  Plan of Care Review  Plan of Care Reviewed With: patient  Outcome Evaluation: Pt is a 79 y.o. year old female admitted 4/21/24 s/p fall 1 month ago with inability to ambulate. Xray (+) L displaced femoral neck fx.     Pmhx significant for dementia, anxiety, Afib, HTN.    At baseline, pt resides at [facility]. Per chart, patient was previously about to ambulate short distances and transfer from w/c.   No family present to provide additional PLOF information. Patient responds to name, but is unable to respond to questions appropriately. Demos mild echolalia throughout session. NSG states patient has had multiple lab draws and has been intermittently agitated. Pt on soiled rae pad, requiring Max A x2 for sit<>stand for cleaning. Patient becomes very agitated with transfer, attempting to pull all medical lines and yelling at staff.  Patient returned to supine,  and once calm OT / NSG staff able to redonn heart monitor leads. Tucked IV through gown to decrease pt ability to grasp / pull IV line. Patient requires 24 hour care and will require SNF at d/c.     Time Calculation:         Time Calculation- OT       Row Name 04/24/24 1404             Time Calculation- OT    OT Start Time 1030  -ES      OT Stop Time 1100  -ES      OT Time Calculation (min) 30 min  -ES      Total Timed Code Minutes- OT 0 minute(s)  -ES      OT Received On 04/24/24  -ES      OT - Next Appointment 04/25/24  -ES      OT Goal Re-Cert Due Date 05/08/24  -ES                User Key  (r) = Recorded By, (t) = Taken By, (c) = Cosigned By      Initials Name Provider Type    Suly Rizvi OT Occupational Therapist                  Therapy Charges for Today       Code Description Service Date Service Provider Modifiers Qty    [id number] HC OT EVAL HIGH COMPLEXITY 4 4/24/2024 Suly Cleveland OT GO 1                 Suly Cleveland OT  4/24/2024

## 2024-04-24 NOTE — PLAN OF CARE
Goal Outcome Evaluation:  Plan of Care Reviewed With: patient        Progress: no change  Outcome Evaluation: pt is post op left hip fracture, with cardizem drip titrating with HR elevated, cardio was consulted, c/o pain gave prn meds, prn meds were not lasting till next dose pt was in pain, called on call dr, he changed frequency

## 2024-04-24 NOTE — SIGNIFICANT NOTE
#A. Fib with RVR    - EKG shows A. Fib with RVR    - POD #0 left hip hemiarthroplasty    - known hx of A. Fib    - hold home Eliquis as POD #0     - Currently on Cardizem CD    - Cardizem Drip, BP elevated and should tolerate    - cardiology following    - check TSH    - transfer to PCU    Electronically signed by Hanh Argueta DO, 04/23/24, 8:06 PM EDT.

## 2024-04-24 NOTE — THERAPY TREATMENT NOTE
Acute Care - Speech Language Pathology   Swallow Treatment Note  [facility]     Patient Name: Philly Valentin  : 1945  MRN: [id number]  Today's Date: 2024               Admit Date: 2024    Visit Dx:     ICD-10-CM ICD-9-CM   1. Closed fracture of neck of left femur, initial encounter  S72.002A 820.8     Patient Active Problem List   Diagnosis    Closed left hip fracture    Dementia    Anxiety disorder    Atrial fibrillation    Essential hypertension    Leukocytosis     Past Medical History:   Diagnosis Date    Afib     Dementia     Fall 2023    Hypertension     Mood disorder      Past Surgical History:   Procedure Laterality Date    HIP BIPOLAR REPLACEMENT Left 2024    Procedure: HIP BIPOLAR CEMENTED;  Surgeon: Isma Lopes MD;  Location: [facility];  Service: Orthopedics;  Laterality: Left;       SLP Recommendation and Plan     SWALLOW EVALUATION (Last 72 Hours)            EDUCATION  The patient has been educated in the following areas:   Dysphagia (Swallowing Impairment) Oral Care/Hydration.        SLP GOALS       Row Name 24 1300       (LTG) Swallow    (LTG) Swallow Patient will tolerate safest and least restrictive diet without complications from aspiration.  -CB    Time Frame (Swallow Long Term Goal) by discharge  -CB    Progress/Outcomes (Swallow Long Term Goal) goal ongoing  -CB       (STG) Swallow 1    (STG) Swallow 1 Patient will participate in full meal assessment to assure safety and adequacy of recommended diet.  -CB    Time Frame (Swallow Short Term Goal 1) 1 week  -CB    Progress/Outcomes (Swallow Short Term Goal 1) --    Comment (Swallow Short Term Goal 1) Patient was seen for DT/meal at lunch. Patient requires assistance with feeding. Patient was sitting upright at 90 degree hip flexion in a recliner. Propped patient head with a pillow to adjust  head more prone. Patient currently receives a regular diet.  Patient was fed part of meal by ST. Patient brigette  adequate rotary chewing. Patient cleared oral cavity effectively without evidence of oral residue and/or pocketing was observed.   No clearing of throat, cough and/or vocal changes were identified. Patient took sips of thins via straw without overt s/s of aspiration. Vocal quality remained clear. Patient continues to be on room air. ST will continue to follow to assure safety and tolerance with least restrictive diet.  -CB              User Key  (r) = Recorded By, (t) = Taken By, (c) = Cosigned By      Initials Name Provider Type    Radha Zurita, SLP Speech and Language Pathologist                       Time Calculation:                SRIDHAR Johnson  4/24/2024

## 2024-04-24 NOTE — PLAN OF CARE
Problem: Adult Inpatient Plan of Care  Goal: Absence of Hospital-Acquired Illness or Injury  Intervention: Identify and Manage Fall Risk  Recent Flowsheet Documentation  Taken 4/24/2024 1806 by Shannan Bernard RN  Safety Promotion/Fall Prevention:   assistive device/personal items within reach   clutter free environment maintained   fall prevention program maintained   lighting adjusted   nonskid shoes/slippers when out of bed   room organization consistent   safety round/check completed  Taken 4/24/2024 1617 by Shannan Bernard RN  Safety Promotion/Fall Prevention: patient off unit  Taken 4/24/2024 1439 by Shannan Bernard RN  Safety Promotion/Fall Prevention:   assistive device/personal items within reach   clutter free environment maintained   fall prevention program maintained   lighting adjusted   nonskid shoes/slippers when out of bed   room organization consistent   safety round/check completed  Taken 4/24/2024 1215 by Shannan Bernard RN  Safety Promotion/Fall Prevention:   assistive device/personal items within reach   clutter free environment maintained   fall prevention program maintained   lighting adjusted   nonskid shoes/slippers when out of bed   room organization consistent   safety round/check completed  Taken 4/24/2024 1041 by Shannan Bernard RN  Safety Promotion/Fall Prevention:   assistive device/personal items within reach   clutter free environment maintained   fall prevention program maintained   lighting adjusted   nonskid shoes/slippers when out of bed   room organization consistent   safety round/check completed  Taken 4/24/2024 0803 by Shannan Bernard, JEAN CARLOS  Safety Promotion/Fall Prevention:   assistive device/personal items within reach   clutter free environment maintained   fall prevention program maintained   lighting adjusted   nonskid shoes/slippers when out of bed   room organization consistent   safety round/check completed  Intervention: Prevent Skin Injury  Recent Flowsheet Documentation  Taken  4/24/2024 1806 by Shannan Bernard RN  Body Position: turned  Taken 4/24/2024 1439 by Shannan Bernard RN  Body Position: turned  Taken 4/24/2024 1215 by Shannan Bernard RN  Body Position: weight shifting  Taken 4/24/2024 1041 by Shannan Bernard RN  Body Position: weight shifting  Taken 4/24/2024 0803 by Shannan Bernard RN  Body Position:   turned   left  Intervention: Prevent and Manage VTE (Venous Thromboembolism) Risk  Recent Flowsheet Documentation  Taken 4/24/2024 1806 by Shannan Bernard RN  Activity Management: activity minimized  Taken 4/24/2024 1439 by Shannan Bernard RN  Activity Management: activity minimized  Taken 4/24/2024 1215 by Shannan Bernard RN  Activity Management: up in chair  Taken 4/24/2024 1041 by Shannan Bernard RN  Activity Management: up in chair  Taken 4/24/2024 0803 by Shannan Bernard RN  Activity Management: bedrest  Range of Motion: active ROM (range of motion) encouraged  Intervention: Prevent Infection  Recent Flowsheet Documentation  Taken 4/24/2024 1806 by Shannan Bernard RN  Infection Prevention:   hand hygiene promoted   personal protective equipment utilized  Taken 4/24/2024 1439 by Shannan Bernard RN  Infection Prevention:   hand hygiene promoted   personal protective equipment utilized  Taken 4/24/2024 1215 by Shannan Bernard RN  Infection Prevention:   personal protective equipment utilized   hand hygiene promoted  Taken 4/24/2024 1041 by Shannan Bernard RN  Infection Prevention:   hand hygiene promoted   personal protective equipment utilized  Taken 4/24/2024 0803 by Shannan Bernard RN  Infection Prevention:   hand hygiene promoted   personal protective equipment utilized  Goal: Optimal Comfort and Wellbeing  Intervention: Provide Person-Centered Care  Recent Flowsheet Documentation  Taken 4/24/2024 1215 by Shannan Bernard RN  Trust Relationship/Rapport: care explained  Taken 4/24/2024 0803 by Shannan Bernard RN  Trust Relationship/Rapport: emotional support provided   Goal Outcome Evaluation:               Pt  now on PO cardizem. Alert to self only. Pain medication given PRN s/p Hip surgery. Will return to [place]. VSS, call light in reach., Safety prec taken. Plan of care to continue.

## 2024-04-24 NOTE — PROGRESS NOTES
Orthopedic Progress Note    Subjective :   Patient seen and examined.  Nurses at bedside.  No acute issues overnight.    Objective :    Vital signs in last 24 hours:  Temp:  [95.6 °F (35.3 °C)-99.2 °F (37.3 °C)] 97.8 °F (36.6 °C)  Heart Rate:  [] 126  Resp:  [14-22] 14  BP: ()/(52-98) 134/61  Vitals:    04/24/24 0445 04/24/24 0500 04/24/24 0531 04/24/24 0600   BP: 136/72 137/71 115/85 134/61   BP Location: Right arm Right arm Right arm Right arm   Patient Position:  Lying Lying Lying   Pulse: 91 96 108 (!) 126   Resp:       Temp:       TempSrc:       SpO2:  94%     Weight:       Height:           PHYSICAL EXAM:  Patient is calm, in no acute distress,   Dressing clean, dry and intact.  No signs of infection.  Swelling is appropriate.  Ecchymosis is appropriate in amount.  Homans test is negative.  Patient is neurovascularly intact distally.  Moves toes freely.  Compartments are soft.  Sensation intact.  Good capillary refill.  Foot warm and well-perfused.    LABS:  Results from last 7 days   Lab Units 04/24/24  0018 04/23/24  0036   WBC 10*3/mm3  --  10.54   HEMOGLOBIN g/dL 13.7 14.1   HEMATOCRIT % 43.0 43.7   PLATELETS 10*3/mm3  --  280     Results from last 7 days   Lab Units 04/24/24  0226   SODIUM mmol/L 137   POTASSIUM mmol/L 4.7   CHLORIDE mmol/L 102   CO2 mmol/L 23.0   BUN mg/dL 27*   CREATININE mg/dL 0.75   GLUCOSE mg/dL 151*   CALCIUM mg/dL 8.9     Results from last 7 days   Lab Units 04/23/24  0036 04/21/24  1749   INR  0.96 0.96   APTT seconds 29.5 29.1*       Study Result    Narrative & Impression   XR HIP W OR WO PELVIS 1 VIEW LEFT     Date of Exam: 4/23/2024 4:00 PM EDT     Indication: Post-Op Hip Arthroplasty     Comparison: None available.     Findings:  Single view. There is a total hip arthroplasty which appears in normal anatomic alignment. There are no fractures or hardware complications.     IMPRESSION:  Impression:  Normal-appearing hip arthroplasty.        Electronically  Signed: Alanna Campo MD    4/23/2024 4:14 PM EDT    Workstation ID: [id number]         ASSESSMENT:  Status post left bipolar hemiarthroplasty, POD #1    Plan:  Continue Physical Therapy, weightbearing as tolerated to the left lower extremity.  Anterior and abduction hip precautions..  Continue SCDs, Continue Eliquis 5 mg twice daily for DVT prophylaxis.  Dispo planning for likely rehab when medically stable depending on physical therapy and hospitalist recommendations.    We will sign off from orthopedic standpoint.  Please call if needed.  Thank you very much for allowing us to be part of the patient's care.  Patient to follow-up in the office in 2 weeks.    Rodrigo Zhu PA-C    Date: 4/24/2024  Time: 06:39 EDT

## 2024-04-24 NOTE — PLAN OF CARE
Assumed care of patient at bedside report from Dhara GARCIA. Updated on POC. Call light within reach. Whiteboard updated. Fall precautions in place. Bed locked and in lowest position. All questions answered. No other needs indicated at this time.     Goal Outcome Evaluation:  Plan of Care Reviewed With: patient           Outcome Evaluation: 78 y/o female who apparently fell 1 month ago and now unable to ambulate per nursing home staff. Found to have subcapital L femoral neck fx on 4/21 and now s/p L hemiarthroplasty on 4/23. Patient is WBAT. Patient also found to be tachycardic upon admission. PMH includes dementia, anxiety, afib. Patient is unable to report PLOF and no family at bedside. At time of eval patient able to actively move RLE but needed AAROM for RLE and min limitation due to pain. Patient needed min/mod A of 1-2 for supine to sit , needing assitance to move LLE to edge of bed. Patient comes to standing with min /mod A of 1-2 using rw for support and managed to ambulate 3 ft. Noted crying out loud during standing /gait activities but no buckling on L knee noted and patient able to advance LLE with cues. Patient only able to state her name and part of her birthdate, follows about 75% of commands. Patient will need skilled rehab services upon return to nursing home to promote return to PLOF.      Anticipated Discharge Disposition (PT): skilled nursing facility

## 2024-04-25 VITALS
SYSTOLIC BLOOD PRESSURE: 104 MMHG | HEIGHT: 64 IN | TEMPERATURE: 97.6 F | DIASTOLIC BLOOD PRESSURE: 52 MMHG | BODY MASS INDEX: 27.31 KG/M2 | WEIGHT: 160 LBS | HEART RATE: 88 BPM | RESPIRATION RATE: 17 BRPM | OXYGEN SATURATION: 93 %

## 2024-04-25 PROCEDURE — 97112 NEUROMUSCULAR REEDUCATION: CPT

## 2024-04-25 PROCEDURE — 97535 SELF CARE MNGMENT TRAINING: CPT

## 2024-04-25 PROCEDURE — 97530 THERAPEUTIC ACTIVITIES: CPT

## 2024-04-25 PROCEDURE — 63710000001 PREDNISONE PER 5 MG: Performed by: ORTHOPAEDIC SURGERY

## 2024-04-25 PROCEDURE — 92526 ORAL FUNCTION THERAPY: CPT

## 2024-04-25 RX ORDER — METOPROLOL TARTRATE 50 MG/1
50 TABLET, FILM COATED ORAL 3 TIMES DAILY
Qty: 90 TABLET | Refills: 0 | Status: SHIPPED | OUTPATIENT
Start: 2024-04-25

## 2024-04-25 RX ORDER — DILTIAZEM HYDROCHLORIDE 180 MG/1
360 CAPSULE, COATED, EXTENDED RELEASE ORAL
Status: DISCONTINUED | OUTPATIENT
Start: 2024-04-26 | End: 2024-04-25 | Stop reason: HOSPADM

## 2024-04-25 RX ORDER — AMOXICILLIN 250 MG
2 CAPSULE ORAL 2 TIMES DAILY
Qty: 60 TABLET | Refills: 0 | Status: SHIPPED | OUTPATIENT
Start: 2024-04-25

## 2024-04-25 RX ORDER — BISACODYL 10 MG
10 SUPPOSITORY, RECTAL RECTAL DAILY
Status: DISCONTINUED | OUTPATIENT
Start: 2024-04-25 | End: 2024-04-25 | Stop reason: HOSPADM

## 2024-04-25 RX ORDER — DILTIAZEM HYDROCHLORIDE 360 MG/1
360 CAPSULE, EXTENDED RELEASE ORAL
Qty: 30 CAPSULE | Refills: 0 | Status: SHIPPED | OUTPATIENT
Start: 2024-04-26

## 2024-04-25 RX ORDER — AMOXICILLIN 250 MG
2 CAPSULE ORAL 2 TIMES DAILY
Status: DISCONTINUED | OUTPATIENT
Start: 2024-04-25 | End: 2024-04-25 | Stop reason: HOSPADM

## 2024-04-25 RX ORDER — POLYETHYLENE GLYCOL 3350 17 G/17G
17 POWDER, FOR SOLUTION ORAL 2 TIMES DAILY
Qty: 30 PACKET | Refills: 0 | Status: SHIPPED | OUTPATIENT
Start: 2024-04-25

## 2024-04-25 RX ORDER — METOPROLOL TARTRATE 50 MG/1
50 TABLET, FILM COATED ORAL 3 TIMES DAILY
Status: DISCONTINUED | OUTPATIENT
Start: 2024-04-25 | End: 2024-04-25 | Stop reason: HOSPADM

## 2024-04-25 RX ADMIN — TRAMADOL HYDROCHLORIDE 50 MG: 50 TABLET, COATED ORAL at 10:10

## 2024-04-25 RX ADMIN — METOPROLOL TARTRATE 50 MG: 50 TABLET, FILM COATED ORAL at 07:39

## 2024-04-25 RX ADMIN — SERTRALINE HYDROCHLORIDE 75 MG: 50 TABLET ORAL at 07:39

## 2024-04-25 RX ADMIN — APIXABAN 5 MG: 5 TABLET, FILM COATED ORAL at 07:39

## 2024-04-25 RX ADMIN — DOCUSATE SODIUM 50MG AND SENNOSIDES 8.6MG 2 TABLET: 8.6; 5 TABLET, FILM COATED ORAL at 12:22

## 2024-04-25 RX ADMIN — LORAZEPAM 0.5 MG: 0.5 TABLET ORAL at 07:39

## 2024-04-25 RX ADMIN — BISACODYL 10 MG: 10 SUPPOSITORY RECTAL at 12:22

## 2024-04-25 RX ADMIN — BUMETANIDE 1 MG: 1 TABLET ORAL at 07:40

## 2024-04-25 RX ADMIN — LORAZEPAM 0.5 MG: 0.5 TABLET ORAL at 14:34

## 2024-04-25 RX ADMIN — DILTIAZEM HYDROCHLORIDE 300 MG: 180 CAPSULE, COATED, EXTENDED RELEASE ORAL at 07:39

## 2024-04-25 RX ADMIN — ACETAMINOPHEN 325 MG: 325 TABLET, FILM COATED ORAL at 12:22

## 2024-04-25 RX ADMIN — POLYETHYLENE GLYCOL 3350 17 G: 17 POWDER, FOR SOLUTION ORAL at 07:39

## 2024-04-25 RX ADMIN — PREDNISONE 10 MG: 10 TABLET ORAL at 07:39

## 2024-04-25 RX ADMIN — METOPROLOL TARTRATE 50 MG: 50 TABLET, FILM COATED ORAL at 15:43

## 2024-04-25 RX ADMIN — ACETAMINOPHEN 325 MG: 325 TABLET, FILM COATED ORAL at 07:39

## 2024-04-25 NOTE — PROGRESS NOTES
Procedure(s):  HIP BIPOLAR CEMENTED     LOS: 4 days     Subjective :   Complains of pain in the left hip that is controlled with meds.    Objective :    Vital signs in last 24 hours:  Vitals:    04/24/24 1705 04/24/24 1938 04/25/24 0000 04/25/24 0415   BP: 142/78 123/65 105/54 125/57   BP Location: Right arm Right arm Right arm Right arm   Patient Position: Lying Lying Lying Lying   Pulse: (!) 133 103 83 112   Resp: 22 15 14 25   Temp: 97.8 °F (36.6 °C) 98.1 °F (36.7 °C) 97.4 °F (36.3 °C) 98.8 °F (37.1 °C)   TempSrc: Oral Oral Axillary Oral   SpO2: 96% 94% 94% 95%   Weight:       Height:           PHYSICAL EXAM:  Patient is calm, in no acute distress, awake and oriented x 3.  Dressing is clean, dry and intact.  No signs of infection.  Swelling is appropriate in amount.  Ecchymosis is appropriate in amount.  Homans test is negative.  Patient is neurovascularly intact distally.    LABS:  Results from last 7 days   Lab Units 04/24/24  1031   WBC 10*3/mm3 21.00*   HEMOGLOBIN g/dL 13.7   HEMATOCRIT % 42.9   PLATELETS 10*3/mm3 307     Results from last 7 days   Lab Units 04/24/24  1055   SODIUM mmol/L 137   POTASSIUM mmol/L 4.0   CHLORIDE mmol/L 99   CO2 mmol/L 27.0   BUN mg/dL 23   CREATININE mg/dL 0.73   GLUCOSE mg/dL 175*   CALCIUM mg/dL 9.2     Results from last 7 days   Lab Units 04/23/24  0036 04/21/24  1749   INR  0.96 0.96   APTT seconds 29.5 29.1*         ASSESSMENT:  Status post Procedure(s):  HIP BIPOLAR CEMENTED      Plan:  Physical therapy as she is able. Anterior and abduction precautions  Eliquis for Afib and DVT prophylaxis  Follow up in 2 weeks in the office  Ortho will sign off, call with questions      Isma Lopes MD    Date: 4/25/2024  Time: 07:35 EDT

## 2024-04-25 NOTE — PLAN OF CARE
"Assessment: Philly Valentin presents with ADL impairments affecting function including balance, cognition, grasp, motor planning, muscle tone abnormal, pain, postural / trunk control, range of motion (ROM), strength, and visual / perceptual. Demonstrated functioning below baseline abilities indicate the need for continued skilled intervention while inpatient. Pt POD#2 L hemiarthroplasty. Pt disoriented x4 unable to recall name, , demo echolalia throughout session. Pt unable to follow commands appropriately, requiring hand over hand and increased assist to mobilize. Pt unable to engage in eye contact, no visual tracking present. Pt tolerates ~20 minutes sitting edge of bed, provided tasks and challenges to participate in however unable to engage, therefore was provided hand over hand to engage appropriately. Pt has poor rehab potential, OT will continue to follow and progress as appropriate. Tolerating session today without incident. Will continue to follow and progress as tolerated.     Plan/Recommendations:   Moderate Intensity Therapy recommended post-acute care. This is recommended as therapy feels the patient would require 3-4 days per week and wouldn't tolerate \"3 hour daily\" rehab intensity. SNF would be the preferred choice. If the patient does not agree to SNF, arrange HH or OP depending on home bound status. If patient is medically complex, consider LTACH.. Pt requires no DME at discharge.     Pt desires Skilled Rehab placement at discharge. Pt cooperative; agreeable to therapeutic recommendations and plan of care.   "

## 2024-04-25 NOTE — PROGRESS NOTES
The patient remains confused. She cannot follow commands. She cannot provide any ROS. Heart rate is better today.     Vitals  - BP is 125/57. Pulse is  while in the room. Temperature is 98.4. R - 23. Oxygen saturations 95 percent on 2 liters.     Physical Exam  Cardiovascular:  - irregularly, irregular with rate controlled.   Pulmonary:      Effort: Pulmonary effort is normal.      Breath sounds: Normal breath sounds.   Abdominal:      Palpations: Abdomen is soft.   Neurological:      Mental Status: She is alert. Mental status is at baseline. She is disoriented.. She is not able to follow commands.      Afib with RVR - heart rate is still running high. Cardiology has been consulted.  The patient is on cardizem 60mg P.O Q6hrs. Metoprolol 25mg P.O BID. I have increased the patients metoprolol to 50mg P.O BID. The patient is on Xarelto    04/25/2024 - patient is doing ok currently. Heart rate is under better control. Cardiology assistance is appreciated.     2. Hip fracture - primary management as per ortho.   3. Dementia - patient remains disoriented which I believe is baseline. No family is present in the room today.     I will need to discuss code status with the family. A hip fracture in this age group with the patients underlying chronic medical issues caries a very poor prognosis. The patient should be ok to discharge once cleared by specialists which should likely be tomorrow. Should resume anticoagulation.

## 2024-04-25 NOTE — PLAN OF CARE
"Assessment: Philly Valentin presents with functional mobility impairments which indicate the need for skilled intervention. Tolerating session today without incident. Pt alert, but disoriented to self, place, time and situation. Pt unable to follow any commands during PT/OT treatment session. Pt max A/DEPx1-2 for all functional mobility. Will continue to follow and progress as tolerated.     Plan/Recommendations:   If medically appropriate, Moderate Intensity Therapy recommended post-acute care. This is recommended as therapy feels the patient would require 3-4 days per week and wouldn't tolerate \"3 hour daily\" rehab intensity. SNF would be the preferred choice. If the patient does not agree to SNF, arrange HH or OP depending on home bound status. If patient is medically complex, consider LTACH. Pt requires no DME at discharge.     Pt desires Skilled Rehab placement at discharge. Pt cooperative; agreeable to therapeutic recommendations and plan of care.     "

## 2024-04-25 NOTE — NURSING NOTE
Pt with DC orders. Awaiting on EMS transport. Attempted to call Bryan Whiteside for report. No answer. Will attempt again.   [phone] Room 201-B

## 2024-04-25 NOTE — THERAPY TREATMENT NOTE
Acute Care - Speech Language Pathology   Swallow Treatment Note  Ch[facility]     Patient Name: Philly Valentin  : 1945  MRN: [id number]  Today's Date: 2024               Admit Date: 2024    Visit Dx:     ICD-10-CM ICD-9-CM   1. Closed fracture of neck of left femur, initial encounter  S72.002A 820.8     Patient Active Problem List   Diagnosis    Closed left hip fracture    Dementia    Anxiety disorder    Atrial fibrillation    Essential hypertension    Leukocytosis     Past Medical History:   Diagnosis Date    Afib     Dementia     Fall 2023    Hypertension     Mood disorder      Past Surgical History:   Procedure Laterality Date    HIP BIPOLAR REPLACEMENT Left 2024    Procedure: HIP BIPOLAR CEMENTED;  Surgeon: Isma Lopes MD;  Location: [facility];  Service: Orthopedics;  Laterality: Left;       SLP Recommendation and Plan        SWALLOW EVALUATION (Last 72 Hours)            EDUCATION  The patient has been educated in the following areas:   Dysphagia (Swallowing Impairment) Oral Care/Hydration.        SLP GOALS       Row Name 24 1000       (LTG) Swallow    (LTG) Swallow Patient will tolerate safest and least restrictive diet without complications from aspiration.  -CB    Time Frame (Swallow Long Term Goal) by discharge  -CB    Progress/Outcomes (Swallow Long Term Goal) goal met  -CB       (STG) Swallow 1    (STG) Swallow 1 Patient will participate in full meal assessment to assure safety and adequacy of recommended diet.  -CB    Time Frame (Swallow Short Term Goal 1) 1 week  -CB    Progress/Outcomes (Swallow Short Term Goal 1) goal met  -CB    Comment (Swallow Short Term Goal 1) Patient was seen for DT/meal at breakfast. Patient is currently receiving regular diet. Patient was properly positioned upright in bed near 90 degree hip flexion prior to meal. Patient requires full feed. ST fed patient part of her meal. Patient demonstrates adequate rotary chewing. Patient clears oral  cavity between bites  effectively without evidence of oral residue and/or pocketing of bolus. No clearing of throat, cough and/or vocal changes were identified. Patient took sips of thins via straw without any overt s/s of aspiration. No wet vocal quality was detected given occassional  audible responses exhibited. Patient is tolerating least restrictive diet and therefore, ST will s/o at this time.  -CB              User Key  (r) = Recorded By, (t) = Taken By, (c) = Cosigned By      Initials Name Provider Type    Radha Zurita, SLP Speech and Language Pathologist                       Time Calculation:       Therapy Charges for Today       Code Description Service Date Service Provider Modifiers Qty    [id number]  ST TREATMENT SWALLOW 3 4/24/2024 Radha Roberto SLP GN 1                 SRIDHAR Johnson  4/25/2024

## 2024-04-25 NOTE — PLAN OF CARE
Problem: Fall Injury Risk  Goal: Absence of Fall and Fall-Related Injury  Intervention: Promote Injury-Free Environment  Description: Provide a safe, barrier-free environment that encourages independent activity.  Keep care area uncluttered and well-lighted.  Determine need for increased observation or monitoring.  Avoid use of devices that minimize mobility, such as restraints or indwelling urinary catheter.  Recent Flowsheet Documentation  Taken 4/25/2024 0200 by Denilson Isaac, RN  Safety Promotion/Fall Prevention:   activity supervised   assistive device/personal items within reach   clutter free environment maintained   gait belt   fall prevention program maintained   safety round/check completed   room organization consistent   nonskid shoes/slippers when out of bed  Taken 4/25/2024 0000 by Denilson Isaac, RN  Safety Promotion/Fall Prevention:   activity supervised   assistive device/personal items within reach   clutter free environment maintained   fall prevention program maintained   gait belt   safety round/check completed   room organization consistent   nonskid shoes/slippers when out of bed  Taken 4/24/2024 2200 by Denilson Isaac, RN  Safety Promotion/Fall Prevention:   activity supervised   assistive device/personal items within reach   clutter free environment maintained   fall prevention program maintained   gait belt   safety round/check completed   room organization consistent  Taken 4/24/2024 1930 by Denilson Isaac, RN  Safety Promotion/Fall Prevention:   activity supervised   assistive device/personal items within reach   clutter free environment maintained   fall prevention program maintained   gait belt   safety round/check completed   room organization consistent     Problem: Skin Injury Risk Increased  Goal: Skin Health and Integrity  Intervention: Optimize Skin Protection  Description: Perform a full pressure injury risk assessment, as indicated by screening, upon admission to care  unit.  Reassess skin (injury risk, full inspection) frequently (e.g., scheduled interval, with change in condition) to provide optimal early detection and prevention.  Maintain adequate tissue perfusion (e.g., encourage fluid balance; avoid crossing legs, constrictive clothing or devices) to promote tissue oxygenation.  Maintain head of bed at lowest degree of elevation tolerated, considering medical condition and other restrictions.  Avoid positioning onto an area that remains reddened.  Minimize incontinence and moisture (e.g., toileting schedule; moisture-wicking pad, diaper or incontinence collection device; skin moisture barrier).  Cleanse skin promptly and gently when soiled utilizing a pH-balanced cleanser.  Relieve and redistribute pressure (e.g., scheduled position changes, weight shifts, use of support surface, medical device repositioning, protective dressing application, use of positioning device, microclimate control, use of pressure-injury-monitor  Encourage increased activity, such as sitting in a chair at the bedside or early mobilization, when able to tolerate.  Recent Flowsheet Documentation  Taken 4/24/2024 1930 by Denilson Isaac, RN  Pressure Reduction Techniques: weight shift assistance provided  Pressure Reduction Devices: pressure-redistributing mattress utilized     Problem: Adjustment to Illness (Sepsis/Septic Shock)  Goal: Optimal Coping  Intervention: Optimize Psychosocial Adjustment to Illness  Description: Acknowledge, normalize, validate intensity and complexity of patient and support system response to situation.  Provide opportunity for expression of thoughts, feelings and concerns; respond with compassion and reassurance.  Decrease stress and anxiety by providing information about patient’s status and treatment.  Facilitate support system presence and participation in care; consider providing a diary in intensive care situation.  Support coping by recognizing current coping  strategies; provide aid in developing new strategies.  Acknowledge and normalize difficulty in managing life-long lifestyle changes and expectations.  Assess and monitor for signs and symptoms of psychologic distress, anxiety and depression.  Consider palliative care consult for goals of care conversation, if the condition is worsening despite treatment.  Recent Flowsheet Documentation  Taken 4/24/2024 1930 by Denilson Isaac, RN  Family/Support System Care: support provided     Problem: Infection Progression (Sepsis/Septic Shock)  Goal: Absence of Infection Signs and Symptoms  Intervention: Initiate Sepsis Management  Description: Provide fluid therapy, such as crystalloid or albumin, to increase intravascular volume, organ perfusion and oxygen delivery.  Provide respiratory support, such as oxygen therapy, noninvasive or invasive positive pressure ventilation, to achieve oxygenation and ventilation goal; avoid hyperoxemia.  Obtain cultures prior to initiating antimicrobial therapy when possible. Do not delay for laboratory results in the presence of high suspicion or clinical indicators.  Administer intravenous broad-spectrum antimicrobial therapy promptly.  Implement hemodynamic monitoring to guide intravascular support based on individual targeted parameters.  Determine and address underlying source of infection aggressively; implement transmission-based precautions and isolation, as indicated.  Recent Flowsheet Documentation  Taken 4/24/2024 1930 by Denilson Isaac, RN  Infection Prevention:   single patient room provided   rest/sleep promoted   personal protective equipment utilized   hand hygiene promoted   environmental surveillance performed   Goal Outcome Evaluation:      Patient rested well during the shift.  She yells out in pain when repositioned but other than that, no complaints.  She is disoriented to place, time, and situation.  Patient is s/p left hip surgery - dressing is clean/dry/intact.  Vitals  WNL.  Cardizem gtt discontinued 4/24 - rhythm has been A. Fib with a controlled rate.

## 2024-04-25 NOTE — DISCHARGE SUMMARY
y of Present Illness: Philly Valentin is a 79 y.o. female from [facility] with a CMH of dementia, anxiety, essential hypertension, atrial fibrillation on anticoagulation, who presented to [facility] on 4/21/2024 with reports from the nursing home for fall a month ago and was unable to ambulate.  She is awake, alert, pleasant and cooperative, verbal but has childlike confusion likely at baseline with history of dementia.  No information could be obtained from patient.  No family at bedside, minimal records in EMR and no documentation from outside facility.  X-ray was done at outside facility and showed a left hip fracture.  X-ray here per radiology showed mildly comminuted subcapital fracture of the left femoral neck there is a varus angulation and displacement .  Chest x-ray per radiology shows no acute cardiopulmonary findings mildly enlarged cardiac silhouette.  EKG shows atrial fibrillation heart rate 97.  Urinalysis negative for infection.  Labs today show BUN of 28 WBC 10.96. The patient remains confused. She cannot follow commands. She cannot provide any ROS. Heart rate is better today.     Closed left hip fracture per CT, orthopedics consulted, reordered home [place] 5/3/2025 every 6 hours, fall precautions     Atrial fibrillation rate controlled, reordered home diltiazem, Eliquis and metoprolol, continuous cardiac monitoring     Leukocytosis, afebrile urinalysis and chest x-ray negative likely reactive repeat CBC in a.m.     Dementia, on home risperidone reordered     Anxiety disorder, on home sertraline and lorazepam verified by sidebar summary recent prescriptions     Essential hypertension, reordered home Bumex, diltiazem, metoprolol monitor BP    The patient was seen by orthopedics and cardiology here in the hospital.     The patient underwent surgery for repair of her hip. Below is the op note from surgery    Description of Procedure: I saw the patient in preop and marked my initials  on her left hip.  She was then brought back to the operating room, put under general anesthesia, and intubated.  She was laid in lateral position with the left side up on a pegboard.  An axillary roll was placed and all bony prominences were padded.  She was then prepped and draped in the usual manner.  We then took a timeout to confirm her name, the planned procedure, her allergies, her CODE STATUS, and her antibiotics.  I then marked her anatomy on her lateral hip and then made an incision in line with the lateral femur centered on the tip of the greater trochanter.  I then cut down to the subcutaneous tissue to the fascia.  I then cut the fascia in line with the incision.  I then cleared off the bursa and had a good view of the gluteus medius.  I then made a cut through the middle of the gluteus medius down to the greater trochanter.  I then used [employer] to retract and removed to the fatty tissue and the gluteus minimus over the capsule.  I then cut the capsule with a T cut and marked both edges of the capsule with an Ethibond suture.  I then went in line with that count and elevated the anterior gluteus medius and the anterior vastus lateralis off of the proximal femur giving a view of the femoral neck.  I then was able to dislocate the leg and put the foot down in the bag.  I then continued to expose the proximal femur until I could feel the lesser trochanter.  I then used the broach to jules my planned neck cut and made a neck cut about a fingerbreadths above the lesser trochanter.  I then removed the femoral head from the acetabulum.  I then used the trial to track my femoral head size and a 42 mm bipolar size was appropriate.  I then began to prepare the femoral neck.  I used the cookie cutter.  I used the canal finder to find the canal and to lateralize proximally.  I then used the broaches starting with a size 1 and going up to a size 4.  That appeared to be the appropriate size so I then put the  -3 mm  length 42 bipolar trial onto the stem and reduced the hip.  It felt like an appropriate length by exam.  I then took an AP pelvis view and it appeared appropriate length and fill of the femur.  I then dislocated the hip.  We removed the implants.  I then irrigated the shaft with copious saline.  I brushed with the canal brush.  I then placed a cement restrictor 16 cm and from the medial border.  I then again irrigated with copious saline.  I put the tampon suction into the femoral shaft.  They then mixed the cement.  I then injected the cement into the proximal femur.  I pressurized the cement and then placed my for have Avenir stem.  I then waited into the cemented hardened.  I then trialed with a -3 neck length again, but it felt slightly short so I went with the 0 neck length 42 bipolar head.  I put that on to the neck after irrigating the wound with copious saline.  I then reduced the hip.  It felt like it equal leg length and was stable to exam.  I then irrigated again with Irrisept.  I then closed the capsule with the Ethibond tag sutures.  I then  repaired the gluteus medius tendon and the vastus lateralis tendon with #1 Ethibond sutures.  I then closed the fascia with running #1 strata fix suture.  We then closed in layers using 2-0 Vicryl and running strata fix.  She was dressed with skin glue and an island dressing.  She was then rolled to her back, awakened, and extubated.  In recovery she had symmetrical rotation and leg length.  She had palpable pulses.        Assistant: Joaquim Jasso CSFA  was responsible for performing the following activities: Retraction, Suction, Irrigation, Suturing, Closing, and Placing Dressing and their skilled assistance was necessary for the success of this case.    The patient developed Afib with RVR after the procedure. She was transferred to the step down unit and was followed by cardiology. The patient was initially placed on a cardizem drip for control of heart  rate. The patients rate has remained well controlled since the drip and then titration of the metoprolol and cardizem. The patients heart rate is now in the 80s.. The patient had some issues with constipation which has since resolved with the addition of an enema. She will need ongoing stool softeners after discharge.              04/25/2024 note - Vitals  - BP is 125/57. Pulse is  while in the room. Temperature is 98.4. R - 23. Oxygen saturations 95 percent on 2 liters.      Physical Exam  Cardiovascular:  - irregularly, irregular with rate controlled.   Pulmonary:      Effort: Pulmonary effort is normal.      Breath sounds: Normal breath sounds.   Abdominal:      Palpations: Abdomen is soft.   Neurological:      Mental Status: She is alert. Mental status is at baseline. She is disoriented.. She is not able to follow commands.      Afib with RVR - heart rate is still running high. Cardiology has been consulted.  The patient is on cardizem 60mg P.O Q6hrs. Metoprolol 25mg P.O BID. I have increased the patients metoprolol to 50mg P.O BID. The patient is on Xarelto     04/25/2024 - patient is doing ok currently. Heart rate is under better control. Cardiology assistance is appreciated.      2. Hip fracture - primary management as per ortho.   3. Dementia - patient remains disoriented which I believe is baseline. No family is present in the room today.      I will need to discuss code status with the family. A hip fracture in this age group with the patients underlying chronic medical issues caries a very poor prognosis. The patient should be ok to discharge once cleared by specialists which should likely be tomorrow. Should resume anticoagulation.     The patient was started on anticoagulation. Her bowels are not working well. Her overall prognosis remains very poor. The patient had a poor baseline status prior to coming into the hospital. The expected 1 hour mortality from the hip fracture is high with her  advanced age and multiple medical problems. Total time with discharge is greater than than 30 minutes.

## 2024-04-25 NOTE — THERAPY TREATMENT NOTE
Subjective: Pt agreeable to therapeutic plan of care. Pt disoriented to self, person, place, time. Pt unable to follow commands appropriately, unable to demo visual tracking. Pt demo echolalia throughout, able to repeat her name, however when asked name, she is unable to recall name.     POD#2 anterior L hemiarthroplasty     Cognition: disoriented to Person, Place, Time, and Situation    Objective:     Bed Mobility: supine to sit Max-A and Assist x 2, sit to supine dependent x2, rolling L<>R dependent x2   Functional Transfers: N/A or Not attempted.     Balance: sitting EOB, unsupported, and static Mod-A and Max-A R and posterior lean   Functional Ambulation: N/A or Not attempted.    Tolerates sitting edge of bed ~20 minutes, x1 therapist in front to provide tactile cues for hand placement and support in sitting, engaging with pt to participate in activities, x1 therapist in back to support pt and provide trunk stability     Vitals: WNL    Pain: unable to rate, however with bed mobility pt begins to yell out  Location: unable to report however appears to be L hip   Interventions for pain: Repositioned and RN notified  Education: Provided education on the importance of mobility in the acute care setting and Verbal/Tactile Cues      Assessment: Philly Valentin presents with ADL impairments affecting function including balance, cognition, grasp, motor planning, muscle tone abnormal, pain, postural / trunk control, range of motion (ROM), strength, and visual / perceptual. Demonstrated functioning below baseline abilities indicate the need for continued skilled intervention while inpatient. Pt POD#2 L hemiarthroplasty. Pt disoriented x4 unable to recall name, , demo echolalia throughout session. Pt unable to follow commands appropriately, requiring hand over hand and increased assist to mobilize. Pt unable to engage in eye contact, no visual tracking present. Pt tolerates ~20 minutes sitting edge of bed, provided tasks  "and challenges to participate in however unable to engage, therefore was provided hand over hand to engage appropriately. Pt has poor rehab potential, OT will continue to follow and progress as appropriate. Tolerating session today without incident. Will continue to follow and progress as tolerated.     Plan/Recommendations:   Moderate Intensity Therapy recommended post-acute care. This is recommended as therapy feels the patient would require 3-4 days per week and wouldn't tolerate \"3 hour daily\" rehab intensity. SNF would be the preferred choice. If the patient does not agree to SNF, arrange HH or OP depending on home bound status. If patient is medically complex, consider LTACH.. Pt requires no DME at discharge.     Pt desires Skilled Rehab placement at discharge. Pt cooperative; agreeable to therapeutic recommendations and plan of care.     Modified Ashley: 5 = Severe disability (Requires constant nursing care and attention, bedridden, incontinent)    Post-Tx Position: Supine with HOB Elevated, Alarms activated, and Call light and personal items within reach  PPE: gloves and gown    "

## 2024-04-25 NOTE — THERAPY TREATMENT NOTE
"Subjective: Pt agreeable to therapeutic plan of care.    Objective:     Bed mobility - Supine to sitting max Ax2. Rolling L/R DEPx2. Sitting to supine DEPx2. Scooting up towards HOB DEPx2    Sitting balance - ~20 min unsupported sitting with mod-max Ax1 for static and dynamic sitting balance. R side and posterior lean noted. Max verbal, tactile, and visual cues given to attempt to achieve midline sitting posture, with limited follow through. While sitting, pt instructed on multidirectional reaching with UEs, with limited follow through, as well.     Transfers - N/A or Not attempted. Unsafe to attempt, as pt unable to follow commands.     Ambulation - 0 feet N/A or Not attempted.    Pain: Pt unable to verbalize intensity of pain; however, yells out and becomes tearful with mobility.     Education: Provided education on the importance of mobility in the acute care setting and Verbal/Tactile Cues    Assessment: Philly Valentin presents with functional mobility impairments which indicate the need for skilled intervention. Tolerating session today without incident. Pt alert, but disoriented to self, place, time and situation. Pt unable to follow any commands during PT/OT treatment session. Pt max A/DEPx1-2 for all functional mobility. Will continue to follow and progress as tolerated.     Plan/Recommendations:   If medically appropriate, Moderate Intensity Therapy recommended post-acute care. This is recommended as therapy feels the patient would require 3-4 days per week and wouldn't tolerate \"3 hour daily\" rehab intensity. SNF would be the preferred choice. If the patient does not agree to SNF, arrange HH or OP depending on home bound status. If patient is medically complex, consider LTACH. Pt requires no DME at discharge.     Pt desires Skilled Rehab placement at discharge. Pt cooperative; agreeable to therapeutic recommendations and plan of care.         Basic Mobility 6-click:  Rollin = Total, A lot = 2, A " little = 3; 4 = None  Supine>Sit:   1 = Total, A lot = 2, A little = 3; 4 = None   Sit>Stand with arms:  1 = Total, A lot = 2, A little = 3; 4 = None  Bed>Chair:   1 = Total, A lot = 2, A little = 3; 4 = None  Ambulate in room:  1 = Total, A lot = 2, A little = 3; 4 = None  3-5 Steps with railin = Total, A lot = 2, A little = 3; 4 = None  Score: 6    Modified Munira: 5 = Severe disability (Requires constant nursing care and attention, bedridden, incontinent)    Post-Tx Position: Supine with HOB Elevated, Alarms activated, and Call light and personal items within reach  PPE: gloves and gown

## 2024-04-25 NOTE — CASE MANAGEMENT/SOCIAL WORK
"Physicians Statement of Medical Necessity for  Ambulance Transportation    GENERAL INFORMATION     Name: Philly Valentin  YOB: 1945  Medicare #: [id number]  Transport Date: 4/25/24 (Valid for round trips this date, or for scheduled repetitive trips for 60 days from the date signed below.)  Origin: [facility]  Destination: [place], [place]  Is the Patient's stay covered under Medicare Part A (PPS/DRG?)Yes  Closest appropriate facility? Yes  If this a hosp-hosp transfer? No  Is this a hospice patient? No    MEDICAL NECESSITY QUESTIONAIRE    Ambulance Transportation is medically necessary only if other means of transportation are contraindicated or would be potentially harmful to the patient.  To meet this requirement, the patient must be either \"bed confined\" or suffer from a condition such that transport by means other than an ambulance is contraindicated by the patient's condition.  The following questions must be answered by the healthcare professional signing below for this form to be valid:     1) Describe the MEDICAL CONDITION (physical and/or mental) of this patient AT THE TIME OF AMBULANCE TRANSPORT that requires the patient to be transported in an ambulance, and why transport by other means is contraindicated by the patient's condition: New Hip fx; Confusion; Max assist of 2 for transfers; Poor trunk control-unable to sit unsupported    Past Medical History:   Diagnosis Date    Afib     Dementia     Fall 03/2023    Hypertension     Mood disorder       Past Surgical History:   Procedure Laterality Date    HIP BIPOLAR REPLACEMENT Left 4/23/2024    Procedure: HIP BIPOLAR CEMENTED;  Surgeon: Isma Lopes MD;  Location: [facility] MAIN OR;  Service: Orthopedics;  Laterality: Left;      2) Is this patient \"bed confined\" as defined below?Yes   To be \"bed confined\" the patient must satisfy all three of the following criteria:  (1) unable to get up from bed without assistance; AND (2) unable to " ambulate;  AND (3) unable to sit in a chair or wheelchair.  3) Can this patient safely be transported by car or wheelchair van (I.e., may safely sit during transport, without an attendant or monitoring?)No   4. In addition to completing questions 1-3 above, please check any of the following conditions that apply*:          *Note: supporting documentation for any boxes checked must be maintained in the patient's medical records Non-healed fractures, Patient is confused, Moderate/severe pain on movement, Need or possible need for restraints, Special handling/isolation/infection control precautions required, and Unable to tolerate seated position for time needed to transport      SIGNATURE OF PHYSICIAN OR OTHER AUTHORIZED HEALTHCARE PROFESSIONAL    I certify that the above information is true and correct based on my evaluation of this patient, and represent that the patient requires transport by ambulance and that other forms of transport are contraindicated.  I understand that this information will be used by the Centers for Medicare and Medicaid Services (CMS) to support the determiniation of medical necessity for ambulance services, and I represent that I have personal knowledge of the patient's condition at the time of transport.    DS   If this box is checked, I also certify that the patient is physically or mentally incapable of signing the ambulance service's claim form and that the institution with which I am affiliated has furnished care, services or assistance to the patient.  My signature below is made on behalf of the patient pursuant to 42 .36(b)(4). In accordance with 42 .37, the specific reason(s) that the patient is physically or mentally incapable of signing the claim for is as follows: Confusion    Signature of Physician or Healthcare Professional   Selena Amaral RN Date/Time:   4/25/24  (9265)     (For Scheduled repetitive transport, this form is not valid for transports performed  more than 60 days after this date).                                                                                                                                            --------------------------------------------------------------------------------------------  Printed Name and Credentials of Physician or Authorized Healthcare Professional     *Form must be signed by patient's attending physician for scheduled, repetitive transports,.  For non-repetitive ambulance transports, if unable to obtain the signature of the attending physician, any of the following may sign (please select below):     Physician  Clinical Nurse Specialist  Registered Nurse x    Physician Assistant  Discharge Planner  Licensed Practical Nurse     Nurse Practitioner x

## 2024-04-25 NOTE — PLAN OF CARE
Problem: Adult Inpatient Plan of Care  Goal: Absence of Hospital-Acquired Illness or Injury  Intervention: Identify and Manage Fall Risk  Recent Flowsheet Documentation  Taken 4/25/2024 1405 by Shannan Bernard RN  Safety Promotion/Fall Prevention:   assistive device/personal items within reach   clutter free environment maintained   fall prevention program maintained   lighting adjusted   nonskid shoes/slippers when out of bed   room organization consistent   safety round/check completed  Taken 4/25/2024 1232 by Shannan Bernard RN  Safety Promotion/Fall Prevention:   assistive device/personal items within reach   clutter free environment maintained   fall prevention program maintained   lighting adjusted   nonskid shoes/slippers when out of bed   room organization consistent   safety round/check completed  Taken 4/25/2024 1008 by Shannan Bernard RN  Safety Promotion/Fall Prevention:   assistive device/personal items within reach   clutter free environment maintained   fall prevention program maintained   lighting adjusted   nonskid shoes/slippers when out of bed   safety round/check completed   room organization consistent  Taken 4/25/2024 0749 by Shannan Bernard RN  Safety Promotion/Fall Prevention:   assistive device/personal items within reach   clutter free environment maintained   fall prevention program maintained   lighting adjusted   nonskid shoes/slippers when out of bed   room organization consistent   safety round/check completed  Intervention: Prevent Skin Injury  Recent Flowsheet Documentation  Taken 4/25/2024 1405 by Shannan Bernard RN  Body Position:   turned   right  Taken 4/25/2024 1232 by Shannan Bernard RN  Body Position:   turned   weight shifting  Taken 4/25/2024 1131 by Shannan Bernard RN  Body Position:   turned   right  Taken 4/25/2024 1008 by Shannan Bernard RN  Body Position: weight shifting  Taken 4/25/2024 0749 by Shannan Bernard RN  Body Position:   turned   right  Intervention: Prevent and Manage VTE  (Venous Thromboembolism) Risk  Recent Flowsheet Documentation  Taken 4/25/2024 1405 by Shannan Bernard RN  Activity Management: activity encouraged  Taken 4/25/2024 1232 by Shannan Bernard RN  Activity Management: activity encouraged  Taken 4/25/2024 1008 by Shannan Bernard RN  Activity Management: activity encouraged  Taken 4/25/2024 0749 by Shannan Bernard RN  Activity Management: activity minimized  Intervention: Prevent Infection  Recent Flowsheet Documentation  Taken 4/25/2024 1405 by Shannan Bernard RN  Infection Prevention:   hand hygiene promoted   personal protective equipment utilized  Taken 4/25/2024 1232 by Shannan Bernard RN  Infection Prevention:   hand hygiene promoted   personal protective equipment utilized  Taken 4/25/2024 1008 by Shannan Bernard RN  Infection Prevention:   hand hygiene promoted   personal protective equipment utilized  Taken 4/25/2024 0749 by Shannan Bernard RN  Infection Prevention:   personal protective equipment utilized   hand hygiene promoted  Goal: Optimal Comfort and Wellbeing  Intervention: Provide Person-Centered Care  Recent Flowsheet Documentation  Taken 4/25/2024 1232 by Shannan Bernard RN  Trust Relationship/Rapport: care explained  Taken 4/25/2024 0749 by Shannan Bernard RN  Trust Relationship/Rapport: care explained   Goal Outcome Evaluation:    Pt HR remains in the 80's this shift. C/O pain with movement. On RA, oriented only to self at baseline. Awaiting BM, PRN meds given per MAR. Possible DC back to [facility] 4/26/24 if able to have BM. Pt resting in bed at this time. Call light in reach, safety precautions in place. Plan of care to continue.

## 2024-04-25 NOTE — ANESTHESIA POSTPROCEDURE EVALUATION
Patient: Philly Valentin    Procedure Summary       Date: 04/23/24 Room / Location: [facility] OR 11 / [facility] MAIN OR    Anesthesia Start: 1249 Anesthesia Stop: 1547    Procedure: HIP BIPOLAR CEMENTED (Left: Hip) Diagnosis:     Surgeons: Isma Lopes MD Provider: Fouzia Izquierdo CRNA    Anesthesia Type: general ASA Status: 3            Anesthesia Type: general    Vitals  Vitals Value Taken Time   /78 04/23/24 1801   Temp 98.7 °F (37.1 °C) 04/23/24 1801   Pulse 126 04/23/24 1805   Resp 17 04/23/24 1801   SpO2 99 % 04/23/24 1805   Vitals shown include unfiled device data.        Post Anesthesia Care and Evaluation    Patient location during evaluation: PACU  Patient participation: complete - patient participated  Level of consciousness: awake  Pain scale: See nurse's notes for pain score.  Pain management: adequate    Airway patency: patent  Anesthetic complications: No anesthetic complications  PONV Status: none  Cardiovascular status: acceptable  Respiratory status: acceptable and spontaneous ventilation  Hydration status: acceptable    Comments: Patient seen and examined postoperatively; vital signs stable; SpO2 greater than or equal to 90%; cardiopulmonary status stable; nausea/vomiting adequately controlled; pain adequately controlled; no apparent anesthesia complications; patient discharged from anesthesia care when discharge criteria were met

## 2024-04-25 NOTE — PAYOR COMM NOTE
"This is a clinical update for Toni Valentin   Reference/Auth # [id number]     EXTENDED AUTHORIZATION PENDING:     Please call or fax determination to contact below.   Thank you.    Yuki Mckeon RN, BSN  Utilization Review Nurse  [facility]  Direct & confidential phone # [phone]  Fax # [phone]      Toni Valentin (79 y.o. Female)       Date of Birth   1945    Social Security Number       Address   [address] [place] [address] [place] [facility]    Home Phone   [phone]    MRN   [id number]       Mosque   None    Marital Status   Unknown                            Admission Date   4/21/24    Admission Type   Emergency    Admitting Provider   Brammell, Timothy Duane, MD    Attending Provider   Frederick Singer DO    Department, Room/Bed   [facility] 2D, 257/1       Discharge Date       Discharge Disposition       Discharge Destination                                 Attending Provider: Frederick Singer DO    Allergies: Spironolactone, Sulfa Antibiotics    Isolation: Contact   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) (04/21/24), 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)   Code Status: CPR    Ht: 162.6 cm (64\")   Wt: 72.6 kg (160 lb)    Admission Cmt: None   Principal Problem: Closed left hip fracture [S72.002A]                   Active Insurance as of 4/21/2024       Primary Coverage       Payor Plan Insurance Group Employer/Plan Group    MISC MEDICARE REPLACEMENT MISC MCARE REPLACEMENT [id number]       Coverage Address Coverage Phone Number Coverage Fax Number Effective Dates    [address] [phone]  1/1/2024 - None Entered    [facility] [id number]         Subscriber Name Subscriber Birth Date Member ID       TONI VALENTIN 1945 [id number]               Secondary Coverage       Payor Plan Insurance Group Employer/Plan Group    [place]        Payor Plan Address Payor Plan Phone Number Payor Plan Fax Number Effective Dates    [address]   4/21/2024 - None Entered "    [place] [address]         Subscriber Name Subscriber Birth Date Member ID       TONI JUAN 1945 [id number]                     Emergency Contacts        (Rel.) Home Phone Work Phone Mobile Phone    Maryam Shaikh (Relative) -- -- [phone]    SolomonSusana (Legal Guardian) -- -- [phone]    RohanHugh (Other) -- -- [phone]    Renee Aggarwal (Friend) -- -- [phone]              Vital Signs (last day)       Date/Time Temp Temp src Pulse Resp BP Patient Position SpO2    04/25/24 1125 -- -- -- -- 104/52 -- --    04/25/24 1122 -- -- -- -- 97/49 -- --    04/25/24 1120 97.6 (36.4) Axillary 88 17 86/47 -- 93    04/25/24 1118 -- -- 49 -- 98/39 -- --    04/25/24 1010 -- -- 90 -- -- -- --    04/25/24 1000 -- -- 86 -- -- -- --    04/25/24 0950 -- -- 75 -- -- -- --    04/25/24 0940 -- -- 102 -- -- -- --    04/25/24 0930 -- -- 84 -- -- -- --    04/25/24 0920 -- -- 94 -- -- -- --    04/25/24 0910 -- -- 95 -- -- -- 94    04/25/24 0900 -- -- 79 25 -- -- 95    04/25/24 0752 98.4 (36.9) Oral -- 23 -- Lying --    04/25/24 0415 98.8 (37.1) Oral 112 25 125/57 Lying 95    04/25/24 0000 97.4 (36.3) Axillary 83 14 105/54 Lying 94    04/24/24 1938 98.1 (36.7) Oral 103 15 123/65 Lying 94    04/24/24 1705 97.8 (36.6) Oral 133 22 142/78 Lying 96    04/24/24 1242 99.2 (37.3) Oral 93 16 130/69 Sitting 93    04/24/24 1145 -- -- 150 -- -- -- --    04/24/24 1111 -- -- 98 -- 130/69 -- --    04/24/24 1000 -- -- 106 -- -- -- 92    04/24/24 0930 -- -- 82 -- -- -- 95    04/24/24 0925 -- -- 104 -- 145/77 -- --    04/24/24 0900 -- -- 90 -- -- -- 95    04/24/24 0830 -- -- 113 -- -- -- 97    04/24/24 0800 -- -- 117 -- 135/70 -- 95    04/24/24 0756 97.6 (36.4) Oral 125 15 145/77 Lying --    04/24/24 0600 -- -- 126 -- 134/61 Lying --    04/24/24 0531 -- -- 108 -- 115/85 Lying --    04/24/24 0500 -- -- 96 -- 137/71 Lying 94    04/24/24 0445 -- -- 91 -- 136/72 -- --    04/24/24 0430 -- -- 109 -- 145/89 -- 94     04/24/24 0415 -- -- 98 -- 133/68 Lying 94    04/24/24 0400 -- -- 89 -- 129/62 Lying --    04/24/24 0345 -- -- 86 -- 126/68 Lying --    04/24/24 0330 -- -- 125 -- 144/70 Lying --    04/24/24 0315 97.8 (36.6) Axillary 93 14 132/76 Lying 93    04/24/24 0300 -- -- 97 -- 134/67 Lying --    04/24/24 0245 -- -- 92 -- 146/63 Lying --    04/24/24 0230 -- -- 87 -- 144/79 Lying --    04/24/24 0215 -- -- 91 -- 141/81 Lying 95    04/24/24 0200 -- -- 101 -- 134/86 Lying --    04/24/24 0145 -- -- 98 -- 135/79 Lying --    04/24/24 0130 -- -- 93 -- 123/71 Lying --    04/24/24 0115 -- -- 112 -- 125/84 Lying --    04/24/24 0100 -- -- 104 -- 120/62 Lying 94    04/24/24 0045 -- -- 99 -- 129/70 Lying --    04/24/24 0030 -- -- 117 -- 125/62 Lying --    04/24/24 0015 -- -- 115 -- -- -- --    04/24/24 0000 -- -- 124 -- 125/71 Lying --          Operative/Procedure Notes (last 24 hours)  Notes from 04/24/24 1305 through 04/25/24 1305   No notes of this type exist for this encounter.          Physician Progress Notes (last 24 hours)        Frederick Singer, DO at 04/25/24 0950          The patient remains confused. She cannot follow commands. She cannot provide any ROS. Heart rate is better today.     Vitals  - BP is 125/57. Pulse is  while in the room. Temperature is 98.4. R - 23. Oxygen saturations 95 percent on 2 liters.     Physical Exam  Cardiovascular:  - irregularly, irregular with rate controlled.   Pulmonary:      Effort: Pulmonary effort is normal.      Breath sounds: Normal breath sounds.   Abdominal:      Palpations: Abdomen is soft.   Neurological:      Mental Status: She is alert. Mental status is at baseline. She is disoriented.. She is not able to follow commands.      Afib with RVR - heart rate is still running high. Cardiology has been consulted.  The patient is on cardizem 60mg P.O Q6hrs. Metoprolol 25mg P.O BID. I have increased the patients metoprolol to 50mg P.O BID. The patient is on Xarelto    04/25/2024 -  patient is doing ok currently. Heart rate is under better control. Cardiology assistance is appreciated.     2. Hip fracture - primary management as per ortho.   3. Dementia - patient remains disoriented which I believe is baseline. No family is present in the room today.     I will need to discuss code status with the family. A hip fracture in this age group with the patients underlying chronic medical issues caries a very poor prognosis. The patient should be ok to discharge once cleared by specialists which should likely be tomorrow. Should resume anticoagulation.     Electronically signed by Frederick Singer DO at 04/25/24 0952       Isma Lopes MD at 04/25/24 0716              Procedure(s):  HIP BIPOLAR CEMENTED     LOS: 4 days     Subjective :   Complains of pain in the left hip that is controlled with meds.    Objective :    Vital signs in last 24 hours:  Vitals:    04/24/24 1705 04/24/24 1938 04/25/24 0000 04/25/24 0415   BP: 142/78 123/65 105/54 125/57   BP Location: Right arm Right arm Right arm Right arm   Patient Position: Lying Lying Lying Lying   Pulse: (!) 133 103 83 112   Resp: 22 15 14 25   Temp: 97.8 °F (36.6 °C) 98.1 °F (36.7 °C) 97.4 °F (36.3 °C) 98.8 °F (37.1 °C)   TempSrc: Oral Oral Axillary Oral   SpO2: 96% 94% 94% 95%   Weight:       Height:           PHYSICAL EXAM:  Patient is calm, in no acute distress, awake and oriented x 3.  Dressing is clean, dry and intact.  No signs of infection.  Swelling is appropriate in amount.  Ecchymosis is appropriate in amount.  Homans test is negative.  Patient is neurovascularly intact distally.    LABS:  Results from last 7 days   Lab Units 04/24/24  1031   WBC 10*3/mm3 21.00*   HEMOGLOBIN g/dL 13.7   HEMATOCRIT % 42.9   PLATELETS 10*3/mm3 307     Results from last 7 days   Lab Units 04/24/24  1055   SODIUM mmol/L 137   POTASSIUM mmol/L 4.0   CHLORIDE mmol/L 99   CO2 mmol/L 27.0   BUN mg/dL 23   CREATININE mg/dL 0.73   GLUCOSE mg/dL 175*   CALCIUM  mg/dL 9.2     Results from last 7 days   Lab Units 04/23/24  0036 04/21/24  1749   INR  0.96 0.96   APTT seconds 29.5 29.1*         ASSESSMENT:  Status post Procedure(s):  HIP BIPOLAR CEMENTED      Plan:  Physical therapy as she is able. Anterior and abduction precautions  Eliquis for Afib and DVT prophylaxis  Follow up in 2 weeks in the office  Ortho will sign off, call with questions      Isma Lopes MD    Date: 4/25/2024  Time: 07:35 EDT     Electronically signed by Isma Lopes MD at 04/25/24 0738       Isma Lopes MD at 04/24/24 1616          I came to see patient, but she was off of the floor.    Electronically signed by Isma Lopes MD at 04/24/24 1617       Malu Morton MD at 04/24/24 1546          Referring Provider: Frederick Singer DO       SUBJECTIVE    Patient went into atrial fibrillation with RVR post left hip hemiarthroplasty  Currently on diltiazem drip  Will transition to p.o. diltiazem and metoprolol for rate control  Heart rate ranging between 100-1 20  Patient has significant underlying dementia, unable to provide good history    ROS  Review of all systems negative except as indicated above    Personal History:    Past Medical History:   Diagnosis Date    Afib     Dementia     Fall 03/2023    Hypertension     Mood disorder        Past Surgical History:   Procedure Laterality Date    HIP BIPOLAR REPLACEMENT Left 4/23/2024    Procedure: HIP BIPOLAR CEMENTED;  Surgeon: Isma Lopes MD;  Location: [facility];  Service: Orthopedics;  Laterality: Left;       History reviewed. No pertinent family history.    Social History     Tobacco Use    Smoking status: Never    Smokeless tobacco: Never   Vaping Use    Vaping status: Never Used   Substance Use Topics    Alcohol use: Never    Drug use: Never        Home meds:  Prior to Admission medications    Medication Sig Start Date End Date Taking? Authorizing Provider   acetaminophen (TYLENOL) 325 MG tablet  Take 1 tablet by mouth 4 (Four) Times a Day. 4/2/24  Yes Ankit Gaffney MD   bumetanide (BUMEX) 1 MG tablet Take 1 tablet by mouth Every Morning. 4/15/24  Yes Ankit Gaffney MD   Dilt- MG 24 hr capsule Take 1 capsule by mouth Every Morning. 4/7/24  Yes Ankit Gaffney MD   Eliquis 5 MG tablet tablet Take 1 tablet by mouth Every 12 (Twelve) Hours. 4/12/24  Yes Ankit Gaffney MD   HYDROcodone-acetaminophen (NORCO) 5-325 MG per tablet Take 1 tablet by mouth Every 6 (Six) Hours As Needed. 4/21/24  Yes Ankit Gaffney MD   ibuprofen (ADVIL,MOTRIN) 600 MG tablet Take 1 tablet by mouth 3 times a day. 4/19/24  Yes Ankit Gaffney MD   LORazepam (ATIVAN) 0.5 MG tablet Take 1 tablet by mouth 3 times a day. Anxiety. 3/27/24  Yes Ankit Gaffney MD   Menthol, Topical Analgesic, (Biofreeze) 4 % gel Apply  topically. Apply topically to back every 6 hours as needed.   Yes Ankit Gaffney MD   metoprolol tartrate (LOPRESSOR) 25 MG tablet Take 1 tablet by mouth 2 (Two) Times a Day. 3/28/24  Yes Ankit Gaffney MD   polyethylene glycol (MIRALAX) 17 GM/SCOOP powder Take 17 g by mouth Every Morning.   Yes Ankit Gaffney MD   predniSONE (DELTASONE) 10 MG tablet Take 1 tablet by mouth Daily. 4/11/24  Yes Ankit Gaffney MD   risperiDONE (risperDAL) 0.25 MG tablet Take 1 tablet by mouth every night at bedtime. 4/3/24  Yes Ankit Gaffney MD   sertraline (ZOLOFT) 50 MG tablet Take 1.5 tablets by mouth Daily.   Yes Ankit Gaffney MD   polyethylene glycol (MiraLax) 17 GM/SCOOP powder Take 17 g by mouth Daily As Needed.    Ankit Gaffney MD   traMADol (ULTRAM) 50 MG tablet Take 1 tablet by mouth Every 4 (Four) Hours As Needed for Moderate Pain.    Ankit Gaffney MD       Allergies:  Spironolactone and Sulfa antibiotics      OBJECTIVE    Vital Signs  Vitals:    04/24/24 1000 04/24/24 1111 04/24/24 1145 04/24/24 1242   BP:  130/69  130/69   BP  "Location:    Right arm   Patient Position:    Sitting   Pulse: 106 98 (!) 150 93   Resp:    16   Temp:    99.2 °F (37.3 °C)   TempSrc:    Oral   SpO2: 92%   93%   Weight:       Height:           Flowsheet Rows      Flowsheet Row First Filed Value   Admission Height 162.6 cm (64\") Documented at 04/21/2024 1727   Admission Weight 72.6 kg (160 lb) Documented at 04/21/2024 1727              Intake/Output Summary (Last 24 hours) at 4/24/2024 1546  Last data filed at 4/24/2024 0500  Gross per 24 hour   Intake 730 ml   Output 250 ml   Net 480 ml              Physical Exam:  General-no acute distress  No elevated JVP noted.  Cardiovascular-S1-S2 normal, no murmurs noted.  Respiratory-normal breath sounds, no wheezing/crackles.  GI-abdomen is soft and nontender  No pedal edema        Results Review:    BNP        TROPONIN  Results from last 7 days   Lab Units 04/24/24  1332   HSTROP T ng/L 15*       CoAg  Results from last 7 days   Lab Units 04/23/24  0036 04/21/24  1749   INR  0.96 0.96   APTT seconds 29.5 29.1*       Creatinine Clearance  Estimated Creatinine Clearance: 62.1 mL/min (by C-G formula based on SCr of 0.73 mg/dL).      Radiology  XR Chest 1 View    Result Date: 4/24/2024  Impression: Unchanged enlarged cardiac silhouette. No new focal airspace consolidation. Electronically Signed: Martin Olivas MD  4/24/2024 12:28 PM EDT  Workstation ID: [id number]    XR Hip With or Without Pelvis 1 View Left    Result Date: 4/23/2024  Impression: Normal-appearing hip arthroplasty. Electronically Signed: Alanna Campo MD  4/23/2024 4:14 PM EDT  Workstation ID: [id number]    XR Hip 1 View Without Pelvis Left (Surgery Only)    Result Date: 4/23/2024  Impression: Status post left hip arthroplasty. There are no radiographic complications. Electronically Signed: Prosper Blankenship MD  4/23/2024 2:47 PM EDT  Workstation ID: [id number]       EKG  I personally viewed and interpreted the patient's EKG/Telemetry data:  ECG 12 Lead Tachycardia "   Final Result   HEART RATE= 143  bpm   RR Interval= 431  ms   VA Interval=   ms   P Horizontal Axis=   deg   P Front Axis=   deg   QRSD Interval= 59  ms   QT Interval= 292  ms   QTcB= 445  ms   QRS Axis= 13  deg   T Wave Axis= 9  deg   - ABNORMAL ECG -   Atrial fibrillation with rapid V-rate   Low voltage, extremity leads   Consider anterior infarct   When compared with ECG of 21-Apr-2024 17:52:15,   Significant change in rhythm   Electronically Signed By: Malu Morton (ИРИНА) 24-Apr-2024 12:44:27   Date and Time of Study: 2024-04-23 19:58:45      ECG 12 Lead Pre-Op / Pre-Procedure   Final Result   HEART RATE= 97  bpm   RR Interval= 619  ms   VA Interval=   ms   P Horizontal Axis=   deg   P Front Axis=   deg   QRSD Interval= 68  ms   QT Interval= 373  ms   QTcB= 474  ms   QRS Axis= 10  deg   T Wave Axis= -4  deg   - ABNORMAL ECG -   Atrial fibrillation   No previous ECG available for comparison   Electronically Signed By: Washington Jamison (ИРИНА) 22-Apr-2024 06:22:52   Date and Time of Study: 2024-04-21 17:52:15                    ASSESSMENT & PLAN:    Hip fracture status post left hip hemiarthroplasty 4/23/2024  Perioperative atrial fibrillation with RVR  History of atrial fibrillation  Heart rate ranging between 100-1 20, patient has underlying dementia, unable to provide history however appears comfortable overall, blood pressures stable  Wean off diltiazem gtt.  Increase p.o. Cardizem to 300 mg  Continue p.o. metoprolol 50 mg twice daily  Resume apixaban when okay per orthopedic surgery team  Continue home p.o. Bumex    Part of this note may be an electronic transcription/translation of spoken language to printed text using the Dragon Dictation System.    Malu Morton MD  04/24/24  15:46 EDT                  Electronically signed by Malu Morton MD at 04/24/24 1549       Consult Notes (last 24 hours)  Notes from 04/24/24 1305 through 04/25/24 1305   No notes of this type exist for this encounter.

## 2024-04-26 LAB — BACTERIA ISLT: NORMAL

## 2024-04-26 NOTE — CASE MANAGEMENT/SOCIAL WORK
Case Management Discharge Note      Final Note: [facility]         Selected Continued Care - Discharged on 4/25/2024 Admission date: 4/21/2024 - Discharge disposition: Skilled Nursing Facility (DC - External)             Transportation Services  Ambulance: [facility]    Final Discharge Disposition Code: 03 - skilled nursing facility (SNF)

## 2024-04-26 NOTE — PAYOR COMM NOTE
"This is discharge notification for Toni Juan  Reference/Auth # [id number]   Pt discharged to [facility] on 4/25/24    Yuki Mckeon, RN, BSN  Utilization Review Nurse  [facility]  Direct & confidential phone # [phone]  Fax # [phone]      Toni Juan (79 y.o. Female)       Date of Birth   1945    Social Security Number       Address   [address] [place] [address]    Home Phone   [phone]    MRN   [id number]       Pentecostalism   None    Marital Status   Unknown                            Admission Date   4/21/24    Admission Type   Emergency    Admitting Provider   Brammell, Timothy Duane, MD    Attending Provider       Department, Room/Bed   [facility] 2D, 257/1       Discharge Date   4/25/2024    Discharge Disposition   Skilled Nursing Facility (DC - External)    Discharge Destination                                 Attending Provider: (none)   Allergies: Spironolactone, Sulfa Antibiotics    Isolation: None   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) (04/21/24), 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)   Code Status: Prior    Ht: 162.6 cm (64\")   Wt: 72.6 kg (160 lb)    Admission Cmt: None   Principal Problem: Closed left hip fracture [S72.002A]                   Active Insurance as of 4/21/2024       Primary Coverage       Payor Plan Insurance Group Employer/Plan Group    MISC MEDICARE REPLACEMENT MISC MCARE REPLACEMENT [id number]       Coverage Address Coverage Phone Number Coverage Fax Number Effective Dates    [address] [phone]  1/1/2024 - None Entered    [facility] [id number]         Subscriber Name Subscriber Birth Date Member ID       TONI JUAN 1945 [id number]               Secondary Coverage       Payor Plan Insurance Group Employer/Plan Group    [place]        Payor Plan Address Payor Plan Phone Number Payor Plan Fax Number Effective Dates    [address]   4/21/2024 - None Entered    [place] [address]         Subscriber Name " Subscriber Birth Date Member ID       TONI VALENTIN 1945 [id number]                     Emergency Contacts        (Rel.) Home Phone Work Phone Mobile Phone    Maryam Shaikh (Relative) -- -- [phone]    Susana Carbajal (Legal Guardian) -- -- [phone]    Hugh Madrigal (Other) -- -- [phone]    JasenAnnyRenee (Friend) -- -- [phone]                 Discharge Summary        Frederick Singer,  at 04/25/24 1549       Summary:discharge                 y of Present Illness: Toni Valentin is a 79 y.o. female from [facility] with a CMH of dementia, anxiety, essential hypertension, atrial fibrillation on anticoagulation, who presented to [facility] on 4/21/2024 with reports from the nursing home for fall a month ago and was unable to ambulate.  She is awake, alert, pleasant and cooperative, verbal but has childlike confusion likely at baseline with history of dementia.  No information could be obtained from patient.  No family at bedside, minimal records in EMR and no documentation from outside facility.  X-ray was done at outside facility and showed a left hip fracture.  X-ray here per radiology showed mildly comminuted subcapital fracture of the left femoral neck there is a varus angulation and displacement .  Chest x-ray per radiology shows no acute cardiopulmonary findings mildly enlarged cardiac silhouette.  EKG shows atrial fibrillation heart rate 97.  Urinalysis negative for infection.  Labs today show BUN of 28 WBC 10.96. The patient remains confused. She cannot follow commands. She cannot provide any ROS. Heart rate is better today.     Closed left hip fracture per CT, orthopedics consulted, reordered home Calvin 5/3/2025 every 6 hours, fall precautions     Atrial fibrillation rate controlled, reordered home diltiazem, Eliquis and metoprolol, continuous cardiac monitoring     Leukocytosis, afebrile urinalysis and chest x-ray negative likely reactive repeat  CBC in a.m.     Dementia, on home risperidone reordered     Anxiety disorder, on home sertraline and lorazepam verified by sidebar summary recent prescriptions     Essential hypertension, reordered home Bumex, diltiazem, metoprolol monitor BP    The patient was seen by orthopedics and cardiology here in the hospital.     The patient underwent surgery for repair of her hip. Below is the op note from surgery    Description of Procedure: I saw the patient in preop and marked my initials on her left hip.  She was then brought back to the operating room, put under general anesthesia, and intubated.  She was laid in lateral position with the left side up on a pegboard.  An axillary roll was placed and all bony prominences were padded.  She was then prepped and draped in the usual manner.  We then took a timeout to confirm her name, the planned procedure, her allergies, her CODE STATUS, and her antibiotics.  I then marked her anatomy on her lateral hip and then made an incision in line with the lateral femur centered on the tip of the greater trochanter.  I then cut down to the subcutaneous tissue to the fascia.  I then cut the fascia in line with the incision.  I then cleared off the bursa and had a good view of the gluteus medius.  I then made a cut through the middle of the gluteus medius down to the greater trochanter.  I then used Army-Rumsey's to retract and removed to the fatty tissue and the gluteus minimus over the capsule.  I then cut the capsule with a T cut and marked both edges of the capsule with an Ethibond suture.  I then went in line with that count and elevated the anterior gluteus medius and the anterior vastus lateralis off of the proximal femur giving a view of the femoral neck.  I then was able to dislocate the leg and put the foot down in the bag.  I then continued to expose the proximal femur until I could feel the lesser trochanter.  I then used the broach to jules my planned neck cut and made a neck  cut about a fingerbreadths above the lesser trochanter.  I then removed the femoral head from the acetabulum.  I then used the trial to track my femoral head size and a 42 mm bipolar size was appropriate.  I then began to prepare the femoral neck.  I used the cookie cutter.  I used the canal finder to find the canal and to lateralize proximally.  I then used the broaches starting with a size 1 and going up to a size 4.  That appeared to be the appropriate size so I then put the  -3 mm length 42 bipolar trial onto the stem and reduced the hip.  It felt like an appropriate length by exam.  I then took an AP pelvis view and it appeared appropriate length and fill of the femur.  I then dislocated the hip.  We removed the implants.  I then irrigated the shaft with copious saline.  I brushed with the canal brush.  I then placed a cement restrictor 16 cm and from the medial border.  I then again irrigated with copious saline.  I put the tampon suction into the femoral shaft.  They then mixed the cement.  I then injected the cement into the proximal femur.  I pressurized the cement and then placed my for have Avenir stem.  I then waited into the cemented hardened.  I then trialed with a -3 neck length again, but it felt slightly short so I went with the 0 neck length 42 bipolar head.  I put that on to the neck after irrigating the wound with copious saline.  I then reduced the hip.  It felt like it equal leg length and was stable to exam.  I then irrigated again with Irrisept.  I then closed the capsule with the Ethibond tag sutures.  I then  repaired the gluteus medius tendon and the vastus lateralis tendon with #1 Ethibond sutures.  I then closed the fascia with running #1 strata fix suture.  We then closed in layers using 2-0 Vicryl and running strata fix.  She was dressed with skin glue and an island dressing.  She was then rolled to her back, awakened, and extubated.  In recovery she had symmetrical rotation and leg  length.  She had palpable pulses.        Assistant: Joaquim Jasso CSFA  was responsible for performing the following activities: Retraction, Suction, Irrigation, Suturing, Closing, and Placing Dressing and their skilled assistance was necessary for the success of this case.    The patient developed Afib with RVR after the procedure. She was transferred to the step down unit and was followed by cardiology. The patient was initially placed on a cardizem drip for control of heart rate. The patients rate has remained well controlled since the drip and then titration of the metoprolol and cardizem. The patients heart rate is now in the 80s.. The patient had some issues with constipation which has since resolved with the addition of an enema. She will need ongoing stool softeners after discharge.              04/25/2024 note - Vitals  - BP is 125/57. Pulse is  while in the room. Temperature is 98.4. R - 23. Oxygen saturations 95 percent on 2 liters.      Physical Exam  Cardiovascular:  - irregularly, irregular with rate controlled.   Pulmonary:      Effort: Pulmonary effort is normal.      Breath sounds: Normal breath sounds.   Abdominal:      Palpations: Abdomen is soft.   Neurological:      Mental Status: She is alert. Mental status is at baseline. She is disoriented.. She is not able to follow commands.      Afib with RVR - heart rate is still running high. Cardiology has been consulted.  The patient is on cardizem 60mg P.O Q6hrs. Metoprolol 25mg P.O BID. I have increased the patients metoprolol to 50mg P.O BID. The patient is on Xarelto     04/25/2024 - patient is doing ok currently. Heart rate is under better control. Cardiology assistance is appreciated.      2. Hip fracture - primary management as per ortho.   3. Dementia - patient remains disoriented which I believe is baseline. No family is present in the room today.      I will need to discuss code status with the family. A hip fracture in this  age group with the patients underlying chronic medical issues caries a very poor prognosis. The patient should be ok to discharge once cleared by specialists which should likely be tomorrow. Should resume anticoagulation.     The patient was started on anticoagulation. Her bowels are not working well. Her overall prognosis remains very poor. The patient had a poor baseline status prior to coming into the hospital. The expected 1 hour mortality from the hip fracture is high with her advanced age and multiple medical problems. Total time with discharge is greater than than 30 minutes.     Electronically signed by Frederick Singer DO at 04/25/24 [id number]

## 2024-04-29 LAB
BACTERIA SPEC AEROBE CULT: NORMAL
BACTERIA SPEC AEROBE CULT: NORMAL

## (undated) DEVICE — ANTIBACTERIAL UNDYED BRAIDED (POLYGLACTIN 910), SYNTHETIC ABSORBABLE SUTURE: Brand: COATED VICRYL

## (undated) DEVICE — TOWEL,OR,DSP,ST,WHITE,DLX,4/PK,20PK/CS: Brand: MEDLINE

## (undated) DEVICE — PCH INST SURG INVISISHIELD 2PCKT

## (undated) DEVICE — SUT VIC 1 CT1 36IN J947H

## (undated) DEVICE — PROXIMATE RH ROTATING HEAD SKIN STAPLERS (35 WIDE) CONTAINS 35 STAINLESS STEEL STAPLES: Brand: PROXIMATE

## (undated) DEVICE — HANDPIECE SET WITH COAXIAL HIGH FLOW TIP AND SUCTION TUBE: Brand: INTERPULSE

## (undated) DEVICE — DRSNG WND BORDR/ADHS NONADHR/GZ LF 4X10IN STRL

## (undated) DEVICE — HANDPIECE SET WITH COAXIAL MULTI-ORIFICE TIP AND SUCTION TUBE: Brand: INTERPULSE

## (undated) DEVICE — NEEDLE, QUINCKE, 18GX3.5": Brand: MEDLINE

## (undated) DEVICE — COAXIAL FEMORAL CANAL TIP

## (undated) DEVICE — CEMENT MIXING SYSTEM WITH FEMORAL BREAKWAY NOZZLE: Brand: REVOLUTION

## (undated) DEVICE — ELECTRD BLD EZ CLN MOD 6.5IN

## (undated) DEVICE — DRAPE,U/ SHT,SPLIT,PLAS,STERIL: Brand: MEDLINE

## (undated) DEVICE — PENCL ES MEGADINE EZ/CLEAN BUTN W/HOLSTR 10FT

## (undated) DEVICE — DUAL CUT SAGITTAL BLADE

## (undated) DEVICE — ADHS SKIN SURG TISS VISC PREMIERPRO EXOFIN HI/VISC FAST/DRY

## (undated) DEVICE — PK GAM NAIL 50

## (undated) DEVICE — 3M™ IOBAN™ 2 ANTIMICROBIAL INCISE DRAPE 6650EZ: Brand: IOBAN™ 2

## (undated) DEVICE — SUT ETHIB 2 CV V37 MS/4 30IN MX69G

## (undated) DEVICE — NDL HYPO PRECISIONGLIDE REG 25G 1 1/2